# Patient Record
Sex: MALE | Race: WHITE | NOT HISPANIC OR LATINO
[De-identification: names, ages, dates, MRNs, and addresses within clinical notes are randomized per-mention and may not be internally consistent; named-entity substitution may affect disease eponyms.]

---

## 2019-07-24 ENCOUNTER — APPOINTMENT (OUTPATIENT)
Dept: FAMILY MEDICINE | Facility: CLINIC | Age: 59
End: 2019-07-24
Payer: COMMERCIAL

## 2019-07-24 ENCOUNTER — RECORD ABSTRACTING (OUTPATIENT)
Age: 59
End: 2019-07-24

## 2019-07-24 ENCOUNTER — TRANSCRIPTION ENCOUNTER (OUTPATIENT)
Age: 59
End: 2019-07-24

## 2019-07-24 VITALS
WEIGHT: 223 LBS | TEMPERATURE: 98 F | OXYGEN SATURATION: 97 % | HEIGHT: 72 IN | BODY MASS INDEX: 30.2 KG/M2 | HEART RATE: 100 BPM

## 2019-07-24 VITALS — SYSTOLIC BLOOD PRESSURE: 110 MMHG | HEART RATE: 72 BPM | DIASTOLIC BLOOD PRESSURE: 70 MMHG

## 2019-07-24 DIAGNOSIS — Z78.9 OTHER SPECIFIED HEALTH STATUS: ICD-10-CM

## 2019-07-24 LAB
BILIRUB UR QL STRIP: NORMAL
CLARITY UR: CLEAR
GLUCOSE UR-MCNC: NORMAL
HCG UR QL: 0.2 EU/DL
HGB UR QL STRIP.AUTO: NORMAL
KETONES UR-MCNC: NORMAL
LEUKOCYTE ESTERASE UR QL STRIP: NORMAL
NITRITE UR QL STRIP: NORMAL
PH UR STRIP: 5
SP GR UR STRIP: 1

## 2019-07-24 PROCEDURE — 36415 COLL VENOUS BLD VENIPUNCTURE: CPT

## 2019-07-24 PROCEDURE — 99214 OFFICE O/P EST MOD 30 MIN: CPT | Mod: 25

## 2019-07-24 PROCEDURE — 81003 URINALYSIS AUTO W/O SCOPE: CPT | Mod: QW

## 2019-07-24 NOTE — PHYSICAL EXAM
[No Acute Distress] : no acute distress [Well Nourished] : well nourished [Well Developed] : well developed [Well-Appearing] : well-appearing [Normal Sclera/Conjunctiva] : normal sclera/conjunctiva [EOMI] : extraocular movements intact [PERRL] : pupils equal round and reactive to light [Normal Outer Ear/Nose] : the outer ears and nose were normal in appearance [Normal Oropharynx] : the oropharynx was normal [No JVD] : no jugular venous distention [No Lymphadenopathy] : no lymphadenopathy [Supple] : supple [Thyroid Normal, No Nodules] : the thyroid was normal and there were no nodules present [No Respiratory Distress] : no respiratory distress  [No Accessory Muscle Use] : no accessory muscle use [Clear to Auscultation] : lungs were clear to auscultation bilaterally [Normal Rate] : normal rate  [Regular Rhythm] : with a regular rhythm [Normal S1, S2] : normal S1 and S2 [No Murmur] : no murmur heard [No Carotid Bruits] : no carotid bruits [No Abdominal Bruit] : a ~M bruit was not heard ~T in the abdomen [No Varicosities] : no varicosities [Pedal Pulses Present] : the pedal pulses are present [No Edema] : there was no peripheral edema [No Palpable Aorta] : no palpable aorta [No Extremity Clubbing/Cyanosis] : no extremity clubbing/cyanosis [Soft] : abdomen soft [Non Tender] : non-tender [Non-distended] : non-distended [No Masses] : no abdominal mass palpated [No HSM] : no HSM [Normal Bowel Sounds] : normal bowel sounds [Normal Posterior Cervical Nodes] : no posterior cervical lymphadenopathy [Normal Anterior Cervical Nodes] : no anterior cervical lymphadenopathy [No CVA Tenderness] : no CVA  tenderness [No Spinal Tenderness] : no spinal tenderness [No Joint Swelling] : no joint swelling [Grossly Normal Strength/Tone] : grossly normal strength/tone [No Rash] : no rash [Coordination Grossly Intact] : coordination grossly intact [No Focal Deficits] : no focal deficits [Normal Gait] : normal gait [Deep Tendon Reflexes (DTR)] : deep tendon reflexes were 2+ and symmetric [Normal Affect] : the affect was normal [Normal Insight/Judgement] : insight and judgment were intact

## 2019-07-25 LAB
ALBUMIN SERPL ELPH-MCNC: 4.7 G/DL
ALP BLD-CCNC: 68 U/L
ALT SERPL-CCNC: 20 U/L
ANION GAP SERPL CALC-SCNC: 14 MMOL/L
AST SERPL-CCNC: 21 U/L
BASOPHILS # BLD AUTO: 0.04 K/UL
BASOPHILS NFR BLD AUTO: 0.6 %
BILIRUB SERPL-MCNC: 0.3 MG/DL
BUN SERPL-MCNC: 21 MG/DL
CALCIUM SERPL-MCNC: 9.7 MG/DL
CHLORIDE SERPL-SCNC: 102 MMOL/L
CHOLEST SERPL-MCNC: 214 MG/DL
CHOLEST/HDLC SERPL: 4.6 RATIO
CO2 SERPL-SCNC: 22 MMOL/L
CREAT SERPL-MCNC: 1.05 MG/DL
EOSINOPHIL # BLD AUTO: 0.07 K/UL
EOSINOPHIL NFR BLD AUTO: 1 %
ESTIMATED AVERAGE GLUCOSE: 111 MG/DL
GLUCOSE SERPL-MCNC: 97 MG/DL
HBA1C MFR BLD HPLC: 5.5 %
HCT VFR BLD CALC: 43.6 %
HDLC SERPL-MCNC: 47 MG/DL
HGB BLD-MCNC: 14.1 G/DL
IMM GRANULOCYTES NFR BLD AUTO: 0.3 %
LDLC SERPL CALC-MCNC: 121 MG/DL
LYMPHOCYTES # BLD AUTO: 2.31 K/UL
LYMPHOCYTES NFR BLD AUTO: 34 %
MAN DIFF?: NORMAL
MCHC RBC-ENTMCNC: 30.7 PG
MCHC RBC-ENTMCNC: 32.3 GM/DL
MCV RBC AUTO: 94.8 FL
MONOCYTES # BLD AUTO: 0.54 K/UL
MONOCYTES NFR BLD AUTO: 7.9 %
NEUTROPHILS # BLD AUTO: 3.82 K/UL
NEUTROPHILS NFR BLD AUTO: 56.2 %
PLATELET # BLD AUTO: 242 K/UL
POTASSIUM SERPL-SCNC: 4 MMOL/L
PROT SERPL-MCNC: 7.5 G/DL
PSA SERPL-MCNC: 2.02 NG/ML
RBC # BLD: 4.6 M/UL
RBC # FLD: 12.3 %
SODIUM SERPL-SCNC: 138 MMOL/L
T4 FREE SERPL-MCNC: 1.1 NG/DL
TRIGL SERPL-MCNC: 229 MG/DL
TSH SERPL-ACNC: 1.73 UIU/ML
WBC # FLD AUTO: 6.8 K/UL

## 2019-11-06 ENCOUNTER — APPOINTMENT (OUTPATIENT)
Dept: FAMILY MEDICINE | Facility: CLINIC | Age: 59
End: 2019-11-06
Payer: COMMERCIAL

## 2019-11-06 VITALS
BODY MASS INDEX: 27.7 KG/M2 | HEART RATE: 78 BPM | OXYGEN SATURATION: 97 % | WEIGHT: 204.5 LBS | TEMPERATURE: 97.6 F | HEIGHT: 72 IN

## 2019-11-06 VITALS — DIASTOLIC BLOOD PRESSURE: 78 MMHG | HEART RATE: 68 BPM | SYSTOLIC BLOOD PRESSURE: 122 MMHG

## 2019-11-06 PROCEDURE — 99213 OFFICE O/P EST LOW 20 MIN: CPT

## 2020-06-03 ENCOUNTER — APPOINTMENT (OUTPATIENT)
Dept: FAMILY MEDICINE | Facility: CLINIC | Age: 60
End: 2020-06-03
Payer: COMMERCIAL

## 2020-06-03 VITALS — HEART RATE: 76 BPM | DIASTOLIC BLOOD PRESSURE: 78 MMHG | SYSTOLIC BLOOD PRESSURE: 122 MMHG

## 2020-06-03 VITALS
BODY MASS INDEX: 25.71 KG/M2 | HEART RATE: 75 BPM | OXYGEN SATURATION: 97 % | RESPIRATION RATE: 12 BRPM | WEIGHT: 189.6 LBS | TEMPERATURE: 97.9 F

## 2020-06-03 LAB
BILIRUB UR QL STRIP: NORMAL
CLARITY UR: CLEAR
COLLECTION METHOD: NORMAL
GLUCOSE UR-MCNC: NORMAL
HCG UR QL: 0.2 EU/DL
HGB UR QL STRIP.AUTO: NORMAL
KETONES UR-MCNC: NORMAL
LEUKOCYTE ESTERASE UR QL STRIP: NORMAL
NITRITE UR QL STRIP: NORMAL
PH UR STRIP: 6
PROT UR STRIP-MCNC: NORMAL
SP GR UR STRIP: 1.03

## 2020-06-03 PROCEDURE — 36415 COLL VENOUS BLD VENIPUNCTURE: CPT

## 2020-06-03 PROCEDURE — 99213 OFFICE O/P EST LOW 20 MIN: CPT | Mod: 25

## 2020-06-03 PROCEDURE — 81003 URINALYSIS AUTO W/O SCOPE: CPT | Mod: QW

## 2020-06-03 NOTE — PHYSICAL EXAM
[Well Nourished] : well nourished [No Acute Distress] : no acute distress [Well Developed] : well developed [Well-Appearing] : well-appearing [Normal Sclera/Conjunctiva] : normal sclera/conjunctiva [PERRL] : pupils equal round and reactive to light [EOMI] : extraocular movements intact [Normal Outer Ear/Nose] : the outer ears and nose were normal in appearance [No JVD] : no jugular venous distention [Normal Oropharynx] : the oropharynx was normal [Supple] : supple [No Lymphadenopathy] : no lymphadenopathy [Thyroid Normal, No Nodules] : the thyroid was normal and there were no nodules present [No Respiratory Distress] : no respiratory distress  [No Accessory Muscle Use] : no accessory muscle use [Clear to Auscultation] : lungs were clear to auscultation bilaterally [Regular Rhythm] : with a regular rhythm [Normal Rate] : normal rate  [Normal S1, S2] : normal S1 and S2 [No Murmur] : no murmur heard [No Carotid Bruits] : no carotid bruits [No Varicosities] : no varicosities [No Abdominal Bruit] : a ~M bruit was not heard ~T in the abdomen [Pedal Pulses Present] : the pedal pulses are present [No Edema] : there was no peripheral edema [No Palpable Aorta] : no palpable aorta [Soft] : abdomen soft [No Extremity Clubbing/Cyanosis] : no extremity clubbing/cyanosis [Non Tender] : non-tender [Non-distended] : non-distended [No HSM] : no HSM [No Masses] : no abdominal mass palpated [Normal Bowel Sounds] : normal bowel sounds [Normal Posterior Cervical Nodes] : no posterior cervical lymphadenopathy [Normal Anterior Cervical Nodes] : no anterior cervical lymphadenopathy [No Spinal Tenderness] : no spinal tenderness [No CVA Tenderness] : no CVA  tenderness [No Joint Swelling] : no joint swelling [No Rash] : no rash [Grossly Normal Strength/Tone] : grossly normal strength/tone [Coordination Grossly Intact] : coordination grossly intact [No Focal Deficits] : no focal deficits [Normal Gait] : normal gait [Deep Tendon Reflexes (DTR)] : deep tendon reflexes were 2+ and symmetric [Normal Affect] : the affect was normal [Normal Insight/Judgement] : insight and judgment were intact

## 2020-06-04 LAB
ALBUMIN SERPL ELPH-MCNC: 4.8 G/DL
ALP BLD-CCNC: 58 U/L
ALT SERPL-CCNC: 30 U/L
ANION GAP SERPL CALC-SCNC: 15 MMOL/L
AST SERPL-CCNC: 35 U/L
BASOPHILS # BLD AUTO: 0.05 K/UL
BASOPHILS NFR BLD AUTO: 0.7 %
BILIRUB SERPL-MCNC: 0.6 MG/DL
BUN SERPL-MCNC: 28 MG/DL
CALCIUM SERPL-MCNC: 9.9 MG/DL
CHLORIDE SERPL-SCNC: 102 MMOL/L
CHOLEST SERPL-MCNC: 161 MG/DL
CHOLEST/HDLC SERPL: 2.5 RATIO
CO2 SERPL-SCNC: 24 MMOL/L
CREAT SERPL-MCNC: 1.11 MG/DL
EOSINOPHIL # BLD AUTO: 0.06 K/UL
EOSINOPHIL NFR BLD AUTO: 0.8 %
ESTIMATED AVERAGE GLUCOSE: 108 MG/DL
GLUCOSE SERPL-MCNC: 76 MG/DL
HBA1C MFR BLD HPLC: 5.4 %
HCT VFR BLD CALC: 40.6 %
HDLC SERPL-MCNC: 66 MG/DL
HGB BLD-MCNC: 13.2 G/DL
IMM GRANULOCYTES NFR BLD AUTO: 0.3 %
LDLC SERPL CALC-MCNC: 82 MG/DL
LYMPHOCYTES # BLD AUTO: 2.04 K/UL
LYMPHOCYTES NFR BLD AUTO: 28.1 %
MAN DIFF?: NORMAL
MCHC RBC-ENTMCNC: 31.1 PG
MCHC RBC-ENTMCNC: 32.5 GM/DL
MCV RBC AUTO: 95.8 FL
MONOCYTES # BLD AUTO: 0.42 K/UL
MONOCYTES NFR BLD AUTO: 5.8 %
NEUTROPHILS # BLD AUTO: 4.66 K/UL
NEUTROPHILS NFR BLD AUTO: 64.3 %
PLATELET # BLD AUTO: 230 K/UL
POTASSIUM SERPL-SCNC: 4.2 MMOL/L
PROT SERPL-MCNC: 7.4 G/DL
PSA SERPL-MCNC: 2.39 NG/ML
RBC # BLD: 4.24 M/UL
RBC # FLD: 12.4 %
SARS-COV-2 IGG SERPL IA-ACNC: 0.1 INDEX
SARS-COV-2 IGG SERPL QL IA: NEGATIVE
SODIUM SERPL-SCNC: 141 MMOL/L
T4 FREE SERPL-MCNC: 1.1 NG/DL
TRIGL SERPL-MCNC: 67 MG/DL
TSH SERPL-ACNC: 1.31 UIU/ML
WBC # FLD AUTO: 7.25 K/UL

## 2021-06-09 ENCOUNTER — LABORATORY RESULT (OUTPATIENT)
Age: 61
End: 2021-06-09

## 2021-06-09 ENCOUNTER — APPOINTMENT (OUTPATIENT)
Dept: FAMILY MEDICINE | Facility: CLINIC | Age: 61
End: 2021-06-09
Payer: COMMERCIAL

## 2021-06-09 VITALS — HEART RATE: 72 BPM | SYSTOLIC BLOOD PRESSURE: 120 MMHG | DIASTOLIC BLOOD PRESSURE: 70 MMHG

## 2021-06-09 VITALS
WEIGHT: 194 LBS | BODY MASS INDEX: 26.28 KG/M2 | OXYGEN SATURATION: 98 % | TEMPERATURE: 97.6 F | HEIGHT: 72 IN | HEART RATE: 77 BPM

## 2021-06-09 PROCEDURE — 99396 PREV VISIT EST AGE 40-64: CPT | Mod: 25

## 2021-06-09 PROCEDURE — 36415 COLL VENOUS BLD VENIPUNCTURE: CPT

## 2021-06-09 PROCEDURE — 99072 ADDL SUPL MATRL&STAF TM PHE: CPT

## 2021-06-09 PROCEDURE — 81003 URINALYSIS AUTO W/O SCOPE: CPT | Mod: QW

## 2021-06-10 LAB
BILIRUB UR QL STRIP: NORMAL
GLUCOSE UR-MCNC: NORMAL
HCG UR QL: 0.2 EU/DL
HGB UR QL STRIP.AUTO: NORMAL
KETONES UR-MCNC: NORMAL
LEUKOCYTE ESTERASE UR QL STRIP: NORMAL
NITRITE UR QL STRIP: NORMAL
PH UR STRIP: 6.5
PROT UR STRIP-MCNC: NORMAL
SP GR UR STRIP: 1.03

## 2022-05-31 ENCOUNTER — RX RENEWAL (OUTPATIENT)
Age: 62
End: 2022-05-31

## 2022-06-09 ENCOUNTER — APPOINTMENT (OUTPATIENT)
Dept: FAMILY MEDICINE | Facility: CLINIC | Age: 62
End: 2022-06-09
Payer: COMMERCIAL

## 2022-06-09 VITALS
HEART RATE: 73 BPM | TEMPERATURE: 97.6 F | RESPIRATION RATE: 14 BRPM | OXYGEN SATURATION: 98 % | WEIGHT: 197 LBS | BODY MASS INDEX: 26.72 KG/M2

## 2022-06-09 LAB
BILIRUB UR QL STRIP: NORMAL
CLARITY UR: CLEAR
COLLECTION METHOD: NORMAL
GLUCOSE UR-MCNC: NORMAL
HCG UR QL: 0.2 EU/DL
HGB UR QL STRIP.AUTO: NORMAL
KETONES UR-MCNC: NORMAL
LEUKOCYTE ESTERASE UR QL STRIP: NORMAL
NITRITE UR QL STRIP: NORMAL
PH UR STRIP: 5.5
PROT UR STRIP-MCNC: NORMAL
SP GR UR STRIP: 1.01

## 2022-06-09 PROCEDURE — 99214 OFFICE O/P EST MOD 30 MIN: CPT | Mod: 25

## 2022-06-09 PROCEDURE — 36415 COLL VENOUS BLD VENIPUNCTURE: CPT

## 2022-06-09 PROCEDURE — 99396 PREV VISIT EST AGE 40-64: CPT | Mod: 25

## 2022-06-09 PROCEDURE — 81003 URINALYSIS AUTO W/O SCOPE: CPT | Mod: QW

## 2022-06-10 LAB
ALBUMIN SERPL ELPH-MCNC: 4.9 G/DL
ALP BLD-CCNC: 70 U/L
ALT SERPL-CCNC: 31 U/L
ANION GAP SERPL CALC-SCNC: 15 MMOL/L
AST SERPL-CCNC: 30 U/L
BASOPHILS # BLD AUTO: 0.04 K/UL
BASOPHILS NFR BLD AUTO: 0.7 %
BILIRUB SERPL-MCNC: 0.5 MG/DL
BUN SERPL-MCNC: 24 MG/DL
CALCIUM SERPL-MCNC: 10.3 MG/DL
CHLORIDE SERPL-SCNC: 100 MMOL/L
CHOLEST SERPL-MCNC: 188 MG/DL
CO2 SERPL-SCNC: 24 MMOL/L
CREAT SERPL-MCNC: 1.02 MG/DL
EGFR: 84 ML/MIN/1.73M2
EOSINOPHIL # BLD AUTO: 0.02 K/UL
EOSINOPHIL NFR BLD AUTO: 0.3 %
ESTIMATED AVERAGE GLUCOSE: 114 MG/DL
GLUCOSE SERPL-MCNC: 97 MG/DL
HBA1C MFR BLD HPLC: 5.6 %
HCT VFR BLD CALC: 41.9 %
HDLC SERPL-MCNC: 64 MG/DL
HGB BLD-MCNC: 13.9 G/DL
IMM GRANULOCYTES NFR BLD AUTO: 0.2 %
LDLC SERPL CALC-MCNC: 111 MG/DL
LYMPHOCYTES # BLD AUTO: 1.66 K/UL
LYMPHOCYTES NFR BLD AUTO: 27.2 %
MAN DIFF?: NORMAL
MCHC RBC-ENTMCNC: 30.4 PG
MCHC RBC-ENTMCNC: 33.2 GM/DL
MCV RBC AUTO: 91.7 FL
MONOCYTES # BLD AUTO: 0.44 K/UL
MONOCYTES NFR BLD AUTO: 7.2 %
NEUTROPHILS # BLD AUTO: 3.94 K/UL
NEUTROPHILS NFR BLD AUTO: 64.4 %
NONHDLC SERPL-MCNC: 124 MG/DL
PLATELET # BLD AUTO: 294 K/UL
POTASSIUM SERPL-SCNC: 4.6 MMOL/L
PROT SERPL-MCNC: 7.7 G/DL
PSA SERPL-MCNC: 6.98 NG/ML
RBC # BLD: 4.57 M/UL
RBC # FLD: 12.2 %
SODIUM SERPL-SCNC: 139 MMOL/L
T4 FREE SERPL-MCNC: 1.2 NG/DL
TRIGL SERPL-MCNC: 66 MG/DL
TSH SERPL-ACNC: 1.08 UIU/ML
WBC # FLD AUTO: 6.11 K/UL

## 2022-06-13 ENCOUNTER — NON-APPOINTMENT (OUTPATIENT)
Age: 62
End: 2022-06-13

## 2022-06-15 ENCOUNTER — APPOINTMENT (OUTPATIENT)
Dept: UROLOGY | Facility: CLINIC | Age: 62
End: 2022-06-15
Payer: COMMERCIAL

## 2022-06-15 VITALS
HEIGHT: 72 IN | WEIGHT: 197 LBS | HEART RATE: 87 BPM | BODY MASS INDEX: 26.68 KG/M2 | DIASTOLIC BLOOD PRESSURE: 83 MMHG | SYSTOLIC BLOOD PRESSURE: 149 MMHG

## 2022-06-15 PROCEDURE — 99204 OFFICE O/P NEW MOD 45 MIN: CPT

## 2022-06-15 NOTE — PHYSICAL EXAM
[General Appearance - Well Developed] : well developed [General Appearance - Well Nourished] : well nourished [Normal Appearance] : normal appearance [Well Groomed] : well groomed [General Appearance - In No Acute Distress] : no acute distress [Edema] : no peripheral edema [] : no respiratory distress [Respiration, Rhythm And Depth] : normal respiratory rhythm and effort [Exaggerated Use Of Accessory Muscles For Inspiration] : no accessory muscle use [Urethral Meatus] : meatus normal [Urinary Bladder Findings] : the bladder was normal on palpation [Rectal Exam - Seminal Vesicles] : the seminal vesicles were normal [Scrotum] : the scrotum was normal [Epididymis] : the epididymides were normal [Testes Tenderness] : no tenderness of the testes [Testes Mass (___cm)] : there were no testicular masses [Rectal Exam - Rectum] : digital rectal exam was normal [Prostate Enlargement] : the prostate was not enlarged [Prostate Tenderness] : the prostate was not tender [No Prostate Nodules] : no prostate nodules [Normal Station and Gait] : the gait and station were normal for the patient's age [Oriented To Time, Place, And Person] : oriented to person, place, and time [Affect] : the affect was normal [Mood] : the mood was normal [Not Anxious] : not anxious [Inguinal Lymph Nodes Enlarged Bilaterally] : inguinal

## 2022-06-15 NOTE — ASSESSMENT
[FreeTextEntry1] : \par plan:\par - repeat PSA, with no bike riding before\par - ordered prostate MRI\par - next visit in 2 weeks to discuss results and need for prostate biopsy

## 2022-06-28 ENCOUNTER — OUTPATIENT (OUTPATIENT)
Dept: OUTPATIENT SERVICES | Facility: HOSPITAL | Age: 62
LOS: 1 days | End: 2022-06-28
Payer: COMMERCIAL

## 2022-06-28 ENCOUNTER — RESULT REVIEW (OUTPATIENT)
Age: 62
End: 2022-06-28

## 2022-06-28 ENCOUNTER — APPOINTMENT (OUTPATIENT)
Dept: MRI IMAGING | Facility: CLINIC | Age: 62
End: 2022-06-28

## 2022-06-28 DIAGNOSIS — R97.20 ELEVATED PROSTATE SPECIFIC ANTIGEN [PSA]: ICD-10-CM

## 2022-06-28 PROCEDURE — 76498P: CUSTOM | Mod: 26

## 2022-06-28 PROCEDURE — 72197 MRI PELVIS W/O & W/DYE: CPT

## 2022-06-28 PROCEDURE — A9585: CPT

## 2022-06-28 PROCEDURE — 72197 MRI PELVIS W/O & W/DYE: CPT | Mod: 26

## 2022-06-28 PROCEDURE — 76498 UNLISTED MR PROCEDURE: CPT

## 2022-07-05 LAB
PSA FREE FLD-MCNC: 9 %
PSA FREE SERPL-MCNC: 0.45 NG/ML
PSA SERPL-MCNC: 4.87 NG/ML

## 2022-07-11 ENCOUNTER — APPOINTMENT (OUTPATIENT)
Dept: UROLOGY | Facility: CLINIC | Age: 62
End: 2022-07-11

## 2022-07-11 PROCEDURE — 99214 OFFICE O/P EST MOD 30 MIN: CPT

## 2022-07-11 RX ORDER — ENEMA 19; 7 G/133ML; G/133ML
7-19 ENEMA RECTAL
Qty: 1 | Refills: 0 | Status: ACTIVE | COMMUNITY
Start: 2022-07-11 | End: 1900-01-01

## 2022-07-11 RX ORDER — DIAZEPAM 5 MG/1
5 TABLET ORAL
Qty: 1 | Refills: 0 | Status: ACTIVE | COMMUNITY
Start: 2022-07-11 | End: 1900-01-01

## 2022-07-11 NOTE — HISTORY OF PRESENT ILLNESS
[FreeTextEntry1] : 62 yo M for follow up due to elevated PSA\par PMH: HTN\par no FH of prostate cancer\par an avid bike rider\par \par recent PSA follow up:\par 07/01/2022 - 4.87 (free 9%)\par 6/9/2022 - 6.98\par 6/9/2021 - 4.00\par 6/2020 - 2.39\par 7/2019 - 2.02\par \par RACHEL with small prostate and no nodules\par MRI: small prostate, PIRADS 4 lesion\par \par reviewed the results with the patient\par discussed the high likelihood of prostate cancer and the need for biopsies\par discussed fusion transperineal biopsies, the procedure, the possible complications and the recovery.\par discussed this will be done with Dr. Nath at [facility].\par \par plan:\par - fusion biopsies\par  [None] : no symptoms

## 2022-07-11 NOTE — PHYSICAL EXAM
[General Appearance - Well Developed] : well developed [General Appearance - Well Nourished] : well nourished [Normal Appearance] : normal appearance [Well Groomed] : well groomed [General Appearance - In No Acute Distress] : no acute distress [Edema] : no peripheral edema [] : no respiratory distress [Exaggerated Use Of Accessory Muscles For Inspiration] : no accessory muscle use [Respiration, Rhythm And Depth] : normal respiratory rhythm and effort [Oriented To Time, Place, And Person] : oriented to person, place, and time [Affect] : the affect was normal [Mood] : the mood was normal [Not Anxious] : not anxious [Normal Station and Gait] : the gait and station were normal for the patient's age

## 2022-07-13 ENCOUNTER — NON-APPOINTMENT (OUTPATIENT)
Age: 62
End: 2022-07-13

## 2022-07-26 ENCOUNTER — NON-APPOINTMENT (OUTPATIENT)
Age: 62
End: 2022-07-26

## 2022-07-27 ENCOUNTER — APPOINTMENT (OUTPATIENT)
Dept: UROLOGY | Facility: CLINIC | Age: 62
End: 2022-07-27

## 2022-07-27 VITALS
WEIGHT: 197 LBS | HEIGHT: 72 IN | HEART RATE: 82 BPM | BODY MASS INDEX: 26.68 KG/M2 | DIASTOLIC BLOOD PRESSURE: 82 MMHG | SYSTOLIC BLOOD PRESSURE: 130 MMHG | OXYGEN SATURATION: 97 %

## 2022-07-27 VITALS — SYSTOLIC BLOOD PRESSURE: 142 MMHG | DIASTOLIC BLOOD PRESSURE: 84 MMHG

## 2022-07-27 PROCEDURE — 76942 ECHO GUIDE FOR BIOPSY: CPT | Mod: 59

## 2022-07-27 PROCEDURE — 76872 US TRANSRECTAL: CPT

## 2022-07-27 PROCEDURE — 76377 3D RENDER W/INTRP POSTPROCES: CPT

## 2022-07-27 PROCEDURE — 55700: CPT | Mod: 22

## 2022-08-08 ENCOUNTER — APPOINTMENT (OUTPATIENT)
Dept: UROLOGY | Facility: CLINIC | Age: 62
End: 2022-08-08

## 2022-08-08 PROCEDURE — 99214 OFFICE O/P EST MOD 30 MIN: CPT

## 2022-08-08 NOTE — ASSESSMENT
[FreeTextEntry1] : Discussed biopsy results\par PSA = 4.87\par Cait 4+3=7 CAP\par MRI target lesion is positive as well as 4 cores in the right side of the prostate\par he is healthy with no health issues\par We discussed all treatment options for treatment of localized prostate cancer.   \par These included active surveillance, radiation therapy, IMRT and Cyberknife and radical prostatectomy\par We discussed brachytherapy, radiation plus hormonal therapy and cyberknife\par We discussed side effects of HT\par We discussed the risks, benefits and complications of radiation therapy.   We discussed the option of robotic radical prostatectomy and the advantages and disadvantages.   We discussed the risks, benefits and alternatives and complications specifically impotence and incontinence.  We discussed the procedure, in hospital stay and the recovery and expectations.  We discussed my experience with this procedure and my outcomes.\par \par We discussed the cancer outcomes of each options as well.\par He will have a Bone scan and CT scan and he will followup\par I offered him to see a radiation oncologist but he wants to discuss with his wife and get the tests before he does that.\par \par \par

## 2022-08-08 NOTE — HISTORY OF PRESENT ILLNESS
[FreeTextEntry1] : 61 year old healthy man here for bx results\par Tolerated procedure well\par Biopsy reveals [place] 4+3=7, 3+4=7 and 3+3=6 CAP

## 2022-08-10 ENCOUNTER — RESULT REVIEW (OUTPATIENT)
Age: 62
End: 2022-08-10

## 2022-08-29 ENCOUNTER — OUTPATIENT (OUTPATIENT)
Dept: OUTPATIENT SERVICES | Facility: HOSPITAL | Age: 62
LOS: 1 days | End: 2022-08-29

## 2022-08-29 ENCOUNTER — APPOINTMENT (OUTPATIENT)
Dept: CT IMAGING | Facility: CLINIC | Age: 62
End: 2022-08-29

## 2022-08-29 ENCOUNTER — APPOINTMENT (OUTPATIENT)
Dept: NUCLEAR MEDICINE | Facility: CLINIC | Age: 62
End: 2022-08-29

## 2022-08-29 DIAGNOSIS — C61 MALIGNANT NEOPLASM OF PROSTATE: ICD-10-CM

## 2022-08-29 PROCEDURE — 78306 BONE IMAGING WHOLE BODY: CPT | Mod: 26

## 2022-08-29 PROCEDURE — 74177 CT ABD & PELVIS W/CONTRAST: CPT | Mod: 26

## 2022-09-07 ENCOUNTER — APPOINTMENT (OUTPATIENT)
Dept: UROLOGY | Facility: CLINIC | Age: 62
End: 2022-09-07

## 2022-09-07 VITALS
DIASTOLIC BLOOD PRESSURE: 83 MMHG | HEART RATE: 80 BPM | BODY MASS INDEX: 26.68 KG/M2 | OXYGEN SATURATION: 99 % | HEIGHT: 72 IN | WEIGHT: 197 LBS | SYSTOLIC BLOOD PRESSURE: 151 MMHG

## 2022-09-07 PROCEDURE — 99214 OFFICE O/P EST MOD 30 MIN: CPT

## 2022-09-11 NOTE — HISTORY OF PRESENT ILLNESS
[None] : no symptoms [FreeTextEntry1] : 62 yo presents today for a follow-up appointment for prostate cancer. \par Had a recent CT scan (8.29.22.) and NM bone scan (8.29.22.) to r/o metastatic disease. \par Here to determine his plan of care. \par PSA 4.87\par MRI prostate  6.28.22\par PV 35 cc\par PIRADS 4 lesion. \par GS 7 (4+3)\par

## 2022-09-11 NOTE — ASSESSMENT
[FreeTextEntry1] : Reviewed treatment options for prostate cancer.\par re reviewed MRI, PSA and metastatic workup\par He is not a candidate for active surveillance due to GS 7 (4+3).\par Discussed surgery for a robotic radical prostatectomy vs. radiation with hormonal therapy. \par Pre, intra and post-operative procedures and protocols explained to patient. Potential complications such as bleeding, infection, injury to surrounding organs, impotence and incontinence explained to patient. \par Radiation inclusive of Cyberknife explained to patient. Potential complications such as cystitis, proctitis, secondary cancers, impotence and incontinence explained to patient. \par Answered all questions and addressed all concerns.\par Pt. choses to proceed with having surgery. \par Surgical consent signed at time of visit.\par Will proceed with scheduling his surgery as planned.

## 2022-09-16 ENCOUNTER — APPOINTMENT (OUTPATIENT)
Dept: FAMILY MEDICINE | Facility: CLINIC | Age: 62
End: 2022-09-16

## 2022-09-16 VITALS
TEMPERATURE: 97.8 F | SYSTOLIC BLOOD PRESSURE: 124 MMHG | RESPIRATION RATE: 14 BRPM | DIASTOLIC BLOOD PRESSURE: 82 MMHG | OXYGEN SATURATION: 98 % | HEART RATE: 78 BPM

## 2022-09-16 PROCEDURE — 99214 OFFICE O/P EST MOD 30 MIN: CPT

## 2022-09-16 NOTE — ASSESSMENT
[FreeTextEntry1] : HTN:\par patient with BP of 124/80 at today's visit\par Discussed options of coming off of blood pressure medication, however advised him that we should wait to do so until after his radical prostatectomy.  Once he is healed from this procedure, we will begin to slowly titrate down lisinopril if his blood pressure tolerates\par -will continue current medication regimen of lisinopril 20mg at this time\par -Discussed with patient the importance of continuing to manage high blood pressure to decrease risk of heart failure, stroke, artherosclerosis, kidney disease, blindness and dementia.\par \par Prostate Ca\par recent prostate biopsy positive for prostate ca\par patient to undergo robotic radical prostatectomy with urology\par will be coming to office in a few weeks for medical clearance

## 2022-09-16 NOTE — HISTORY OF PRESENT ILLNESS
[FreeTextEntry1] : Pt is here to discuss BP medication concerns. [de-identified] : 62 y/o male with prostate cancer, HTN, presents for follow up.  Patient states that since June he has been on just lisinopril 20 mg daily as opposed to lisinopril-hctz 20-12.5mg daily which he was previously on. He has been following with urology due to an elevated PSA. He underwent MRI and prostate biopsy which was positive for prostate cancer. Workup for metastasis was negative. Patient plans to undergo robotic radical prostatectomy in a few weeks. States that even though he has had spikes in his bp at the urologist office, this was because he was feeling very stressed. He is interested in coming off of his bp meds.

## 2022-09-27 ENCOUNTER — OUTPATIENT (OUTPATIENT)
Dept: OUTPATIENT SERVICES | Facility: HOSPITAL | Age: 62
LOS: 1 days | End: 2022-09-27
Payer: COMMERCIAL

## 2022-09-27 ENCOUNTER — RESULT REVIEW (OUTPATIENT)
Age: 62
End: 2022-09-27

## 2022-09-27 VITALS
TEMPERATURE: 98 F | DIASTOLIC BLOOD PRESSURE: 79 MMHG | SYSTOLIC BLOOD PRESSURE: 144 MMHG | RESPIRATION RATE: 16 BRPM | HEIGHT: 72 IN | HEART RATE: 82 BPM | WEIGHT: 196.21 LBS | OXYGEN SATURATION: 100 %

## 2022-09-27 DIAGNOSIS — H26.9 UNSPECIFIED CATARACT: Chronic | ICD-10-CM

## 2022-09-27 DIAGNOSIS — Z29.9 ENCOUNTER FOR PROPHYLACTIC MEASURES, UNSPECIFIED: ICD-10-CM

## 2022-09-27 DIAGNOSIS — C61 MALIGNANT NEOPLASM OF PROSTATE: ICD-10-CM

## 2022-09-27 DIAGNOSIS — Z98.890 OTHER SPECIFIED POSTPROCEDURAL STATES: Chronic | ICD-10-CM

## 2022-09-27 DIAGNOSIS — Z01.818 ENCOUNTER FOR OTHER PREPROCEDURAL EXAMINATION: ICD-10-CM

## 2022-09-27 LAB
ANION GAP SERPL CALC-SCNC: 4 MMOL/L — LOW (ref 5–17)
APPEARANCE UR: CLEAR — SIGNIFICANT CHANGE UP
APTT BLD: 36.6 SEC — HIGH (ref 27.5–35.5)
BASOPHILS # BLD AUTO: 0.04 K/UL — SIGNIFICANT CHANGE UP (ref 0–0.2)
BASOPHILS NFR BLD AUTO: 0.7 % — SIGNIFICANT CHANGE UP (ref 0–2)
BILIRUB UR-MCNC: NEGATIVE — SIGNIFICANT CHANGE UP
BUN SERPL-MCNC: 23 MG/DL — SIGNIFICANT CHANGE UP (ref 7–23)
CALCIUM SERPL-MCNC: 10 MG/DL — SIGNIFICANT CHANGE UP (ref 8.5–10.1)
CHLORIDE SERPL-SCNC: 107 MMOL/L — SIGNIFICANT CHANGE UP (ref 96–108)
CO2 SERPL-SCNC: 28 MMOL/L — SIGNIFICANT CHANGE UP (ref 22–31)
COLOR SPEC: YELLOW — SIGNIFICANT CHANGE UP
CREAT SERPL-MCNC: 1.02 MG/DL — SIGNIFICANT CHANGE UP (ref 0.5–1.3)
DIFF PNL FLD: NEGATIVE — SIGNIFICANT CHANGE UP
EGFR: 84 ML/MIN/1.73M2 — SIGNIFICANT CHANGE UP
EOSINOPHIL # BLD AUTO: 0.02 K/UL — SIGNIFICANT CHANGE UP (ref 0–0.5)
EOSINOPHIL NFR BLD AUTO: 0.3 % — SIGNIFICANT CHANGE UP (ref 0–6)
GLUCOSE SERPL-MCNC: 103 MG/DL — HIGH (ref 70–99)
GLUCOSE UR QL: NEGATIVE — SIGNIFICANT CHANGE UP
HCT VFR BLD CALC: 42.8 % — SIGNIFICANT CHANGE UP (ref 39–50)
HGB BLD-MCNC: 14.2 G/DL — SIGNIFICANT CHANGE UP (ref 13–17)
IMM GRANULOCYTES NFR BLD AUTO: 0.2 % — SIGNIFICANT CHANGE UP (ref 0–0.9)
INR BLD: 1.03 RATIO — SIGNIFICANT CHANGE UP (ref 0.88–1.16)
KETONES UR-MCNC: NEGATIVE — SIGNIFICANT CHANGE UP
LEUKOCYTE ESTERASE UR-ACNC: NEGATIVE — SIGNIFICANT CHANGE UP
LYMPHOCYTES # BLD AUTO: 1.77 K/UL — SIGNIFICANT CHANGE UP (ref 1–3.3)
LYMPHOCYTES # BLD AUTO: 29.7 % — SIGNIFICANT CHANGE UP (ref 13–44)
MCHC RBC-ENTMCNC: 30.5 PG — SIGNIFICANT CHANGE UP (ref 27–34)
MCHC RBC-ENTMCNC: 33.2 GM/DL — SIGNIFICANT CHANGE UP (ref 32–36)
MCV RBC AUTO: 92 FL — SIGNIFICANT CHANGE UP (ref 80–100)
MONOCYTES # BLD AUTO: 0.37 K/UL — SIGNIFICANT CHANGE UP (ref 0–0.9)
MONOCYTES NFR BLD AUTO: 6.2 % — SIGNIFICANT CHANGE UP (ref 2–14)
NEUTROPHILS # BLD AUTO: 3.75 K/UL — SIGNIFICANT CHANGE UP (ref 1.8–7.4)
NEUTROPHILS NFR BLD AUTO: 62.9 % — SIGNIFICANT CHANGE UP (ref 43–77)
NITRITE UR-MCNC: NEGATIVE — SIGNIFICANT CHANGE UP
PH UR: 6.5 — SIGNIFICANT CHANGE UP (ref 5–8)
PLATELET # BLD AUTO: 205 K/UL — SIGNIFICANT CHANGE UP (ref 150–400)
POTASSIUM SERPL-MCNC: 4.2 MMOL/L — SIGNIFICANT CHANGE UP (ref 3.5–5.3)
POTASSIUM SERPL-SCNC: 4.2 MMOL/L — SIGNIFICANT CHANGE UP (ref 3.5–5.3)
PROT UR-MCNC: NEGATIVE — SIGNIFICANT CHANGE UP
PROTHROM AB SERPL-ACNC: 12 SEC — SIGNIFICANT CHANGE UP (ref 10.5–13.4)
RBC # BLD: 4.65 M/UL — SIGNIFICANT CHANGE UP (ref 4.2–5.8)
RBC # FLD: 11.9 % — SIGNIFICANT CHANGE UP (ref 10.3–14.5)
SODIUM SERPL-SCNC: 139 MMOL/L — SIGNIFICANT CHANGE UP (ref 135–145)
SP GR SPEC: 1 — LOW (ref 1.01–1.02)
UROBILINOGEN FLD QL: NEGATIVE — SIGNIFICANT CHANGE UP
WBC # BLD: 5.96 K/UL — SIGNIFICANT CHANGE UP (ref 3.8–10.5)
WBC # FLD AUTO: 5.96 K/UL — SIGNIFICANT CHANGE UP (ref 3.8–10.5)

## 2022-09-27 PROCEDURE — 86850 RBC ANTIBODY SCREEN: CPT

## 2022-09-27 PROCEDURE — 93005 ELECTROCARDIOGRAM TRACING: CPT

## 2022-09-27 PROCEDURE — 86900 BLOOD TYPING SEROLOGIC ABO: CPT

## 2022-09-27 PROCEDURE — 71046 X-RAY EXAM CHEST 2 VIEWS: CPT | Mod: 26

## 2022-09-27 PROCEDURE — 93010 ELECTROCARDIOGRAM REPORT: CPT

## 2022-09-27 PROCEDURE — 86901 BLOOD TYPING SEROLOGIC RH(D): CPT

## 2022-09-27 PROCEDURE — 87086 URINE CULTURE/COLONY COUNT: CPT

## 2022-09-27 PROCEDURE — 85025 COMPLETE CBC W/AUTO DIFF WBC: CPT

## 2022-09-27 PROCEDURE — 85610 PROTHROMBIN TIME: CPT

## 2022-09-27 PROCEDURE — 85730 THROMBOPLASTIN TIME PARTIAL: CPT

## 2022-09-27 PROCEDURE — 80048 BASIC METABOLIC PNL TOTAL CA: CPT

## 2022-09-27 PROCEDURE — 71046 X-RAY EXAM CHEST 2 VIEWS: CPT

## 2022-09-27 PROCEDURE — 81003 URINALYSIS AUTO W/O SCOPE: CPT

## 2022-09-27 PROCEDURE — 99214 OFFICE O/P EST MOD 30 MIN: CPT | Mod: 25

## 2022-09-27 PROCEDURE — 36415 COLL VENOUS BLD VENIPUNCTURE: CPT

## 2022-09-27 NOTE — H&P PST ADULT - FUNCTIONAL ASSESSMENT - BASIC MOBILITY 6.
To Dr. Navneet Guidry---    Patient called letting you know he was just discharged from the [facility]). He is very weak right now, and was told that a Home Care nurse or RN will be coming out to the house to help him with physical therapy.---OR-----he is not sure if you are suppose to coordinate the Home Care Physical Therapy---------he just returned to his home, this afternoon, after being in the hospital, for 2 weeks with pneumonia. 4 = No assist / stand by assistance

## 2022-09-27 NOTE — H&P PST ADULT - NSICDXPASTMEDICALHX_GEN_ALL_CORE_FT
PAST MEDICAL HISTORY:  Bilateral cataracts     Elevated PSA     Enlarged prostate     HTN (hypertension)     Pneumonia 20 yrs ago    Prostate cancer

## 2022-09-27 NOTE — H&P PST ADULT - NSANTHOSAYNRD_GEN_A_CORE
No. TIMOTHY screening performed.  STOP BANG Legend: 0-2 = LOW Risk; 3-4 = INTERMEDIATE Risk; 5-8 = HIGH Risk

## 2022-09-27 NOTE — H&P PST ADULT - ASSESSMENT
61 y.o male scheduled for  61 y.o male scheduled for Robotic Assisted Prostatectomy   Plan  1. Stop all NSAIDS, herbal supplements and vitamins for 7 days.  2. NPO at midnight.  3. Take the following medications Lisinopril  with small sips of water on the morning of your procedure/surgery.  4. Use EZ sponges as directed  5. Labs, EKG, CXR as per surgeon  6. PMD Brooks Zelaya visit for optimization prior to surgery as per surgeon  7. COVID swab to be done 10/3/2022    CAPRINI SCORE    AGE RELATED RISK FACTORS                                                       MOBILITY RELATED FACTORS  [ x] Age 41-60 years                                            (1 Point)                  [ ] Bed rest                                                        (1 Point)  [ ] Age: 61-74 years                                           (2 Points)                [ ] Plaster cast                                                   (2 Points)  [ ] Age= 75 years                                              (3 Points)                 [ ] Bed bound for more than 72 hours                   (2 Points)    DISEASE RELATED RISK FACTORS                                               GENDER SPECIFIC FACTORS  [ ] Edema in the lower extremities                       (1 Point)                  [ ] Pregnancy                                                     (1 Point)  [ ] Varicose veins                                               (1 Point)                  [ ] Post-partum < 6 weeks                                   (1 Point)             [x ] BMI > 25 Kg/m2                                            (1 Point)                  [ ] Hormonal therapy  or oral contraception            (1 Point)                 [ ] Sepsis (in the previous month)                        (1 Point)                  [ ] History of pregnancy complications  [ ] Pneumonia or serious lung disease                                               [ ] Unexplained or recurrent                       (1 Point)           (in the previous month)                               (1 Point)  [ ] Abnormal pulmonary function test                     (1 Point)                 SURGERY RELATED RISK FACTORS  [ ] Acute myocardial infarction                              (1 Point)                 [ ]  Section                                            (1 Point)  [ ] Congestive heart failure (in the previous month)  (1 Point)                 [ ] Minor surgery                                                 (1 Point)   [ ] Inflammatory bowel disease                             (1 Point)                 [ ] Arthroscopic surgery                                        (2 Points)  [ ] Central venous access                                    (2 Points)                [x ] General surgery lasting more than 45 minutes   (2 Points)       [ ] Stroke (in the previous month)                          (5 Points)               [ ] Elective arthroplasty                                        (5 Points)                                                                                                                                               HEMATOLOGY RELATED FACTORS                                                 TRAUMA RELATED RISK FACTORS  [ ] Prior episodes of VTE                                     (3 Points)                 [ ] Fracture of the hip, pelvis, or leg                       (5 Points)  [ ] Positive family history for VTE                         (3 Points)                 [ ] Acute spinal cord injury (in the previous month)  (5 Points)  [ ] Prothrombin 66385 A                                      (3 Points)                 [ ] Paralysis  (less than 1 month)                          (5 Points)  [ ] Factor V Leiden                                             (3 Points)                 [ ] Multiple Trauma within 1 month                         (5 Points)  [ ] Lupus anticoagulants                                     (3 Points)                                                           [ ] Anticardiolipin antibodies                                (3 Points)                                                       [ ] High homocysteine in the blood                      (3 Points)                                             [ ] Other congenital or acquired thrombophilia       (3 Points)                                                [ ] Heparin induced thrombocytopenia                  (3 Points)                                          Total Score [   4       ]    The Caprini score indicates this patient is at risk for a VTE event (score 3-5).  Most surgical patients in this group would benefit from pharmacologic prophylaxis.  The surgical team will determine the balance between VTE risk and bleeding risk

## 2022-09-27 NOTE — H&P PST ADULT - HISTORY OF PRESENT ILLNESS
61 y.o  61 y.o WD, WN male presents to PST with hx of elevated PSA. Patient states he was noted to have an elevated PSA at his annual physical exam. He was referred to urology and reports further diagnostics and biopsy revealed an enlarged prostate and positive cancer. He discussed options with surgeon and now scheduled for Robotic Assisted Prostatectomy

## 2022-09-28 DIAGNOSIS — Z01.818 ENCOUNTER FOR OTHER PREPROCEDURAL EXAMINATION: ICD-10-CM

## 2022-09-28 DIAGNOSIS — C61 MALIGNANT NEOPLASM OF PROSTATE: ICD-10-CM

## 2022-09-28 LAB
CULTURE RESULTS: SIGNIFICANT CHANGE UP
SPECIMEN SOURCE: SIGNIFICANT CHANGE UP

## 2022-09-29 ENCOUNTER — APPOINTMENT (OUTPATIENT)
Dept: FAMILY MEDICINE | Facility: CLINIC | Age: 62
End: 2022-09-29

## 2022-09-29 VITALS
SYSTOLIC BLOOD PRESSURE: 122 MMHG | DIASTOLIC BLOOD PRESSURE: 80 MMHG | WEIGHT: 197 LBS | OXYGEN SATURATION: 98 % | TEMPERATURE: 97.7 F | BODY MASS INDEX: 26.68 KG/M2 | HEIGHT: 72 IN | RESPIRATION RATE: 14 BRPM | HEART RATE: 77 BPM

## 2022-09-29 DIAGNOSIS — C61 MALIGNANT NEOPLASM OF PROSTATE: ICD-10-CM

## 2022-09-29 PROCEDURE — 99214 OFFICE O/P EST MOD 30 MIN: CPT

## 2022-09-29 NOTE — HISTORY OF PRESENT ILLNESS
[No Pertinent Cardiac History] : no history of aortic stenosis, atrial fibrillation, coronary artery disease, recent myocardial infarction, or implantable device/pacemaker [No Pertinent Pulmonary History] : no history of asthma, COPD, sleep apnea, or smoking [No Adverse Anesthesia Reaction] : no adverse anesthesia reaction in self or family member [(Patient denies any chest pain, claudication, dyspnea on exertion, orthopnea, palpitations or syncope)] : Patient denies any chest pain, claudication, dyspnea on exertion, orthopnea, palpitations or syncope [Good (7-10 METs)] : Good (7-10 METs) [Chronic Anticoagulation] : no chronic anticoagulation [Chronic Kidney Disease] : no chronic kidney disease [Diabetes] : no diabetes [FreeTextEntry1] : 10/06/2022 [FreeTextEntry2] : Prostate removal [FreeTextEntry3] : Dr. figueroa [FreeTextEntry4] : 62 y/o male with prostate cancer, HTN, presents for preop clearance. He will be undergoing a robotic prostatectomy on 10/6/22 with Dr. Nath at [facility].\par \par Patient denies any personal history of heart disease or TIMOTHY. Denies chest pain at rest or on exertion, denies shortness of breath. Never smoker. Has not had adverse reactions to anesthesia in the past. States he is able to walk up a flight of stairs without chest pain or dyspnea.\par  [FreeTextEntry7] : 9/27 EKG: NSR, no ST or T wave changes

## 2022-09-29 NOTE — REVIEW OF SYSTEMS
[Fever] : no fever [Chills] : no chills [Chest Pain] : no chest pain [Palpitations] : no palpitations [Lower Ext Edema] : no lower extremity edema [Shortness Of Breath] : no shortness of breath [Cough] : no cough [Abdominal Pain] : no abdominal pain [Nausea] : no nausea [Diarrhea] : no diarrhea [Vomiting] : no vomiting [Dysuria] : no dysuria [Hematuria] : no hematuria [Headache] : no headache [Dizziness] : no dizziness

## 2022-09-29 NOTE — ASSESSMENT
[Patient Optimized for Surgery] : Patient optimized for surgery [FreeTextEntry4] : Preop labs reviewed: CBC, CMP, and PT/INR, PTT - within normal limits\par CXR: no active chest disease\par EKG: NSR, No ST or T wave abnormalities\par \par Patient is low-intermediate risk for planned procedure. Patient is medically optimized at the time of this visit with no modifiable risk factors.\par

## 2022-09-30 ENCOUNTER — OUTPATIENT (OUTPATIENT)
Dept: OUTPATIENT SERVICES | Facility: HOSPITAL | Age: 62
LOS: 1 days | End: 2022-09-30

## 2022-09-30 ENCOUNTER — RESULT REVIEW (OUTPATIENT)
Age: 62
End: 2022-09-30

## 2022-09-30 DIAGNOSIS — C61 MALIGNANT NEOPLASM OF PROSTATE: ICD-10-CM

## 2022-09-30 DIAGNOSIS — H26.9 UNSPECIFIED CATARACT: Chronic | ICD-10-CM

## 2022-09-30 DIAGNOSIS — Z98.890 OTHER SPECIFIED POSTPROCEDURAL STATES: Chronic | ICD-10-CM

## 2022-09-30 PROBLEM — J18.9 PNEUMONIA, UNSPECIFIED ORGANISM: Chronic | Status: ACTIVE | Noted: 2022-09-27

## 2022-09-30 PROBLEM — N40.0 BENIGN PROSTATIC HYPERPLASIA WITHOUT LOWER URINARY TRACT SYMPTOMS: Chronic | Status: ACTIVE | Noted: 2022-09-27

## 2022-09-30 PROBLEM — R97.20 ELEVATED PROSTATE SPECIFIC ANTIGEN [PSA]: Chronic | Status: ACTIVE | Noted: 2022-09-27

## 2022-09-30 PROBLEM — I10 ESSENTIAL (PRIMARY) HYPERTENSION: Chronic | Status: ACTIVE | Noted: 2022-09-27

## 2022-09-30 PROCEDURE — 88321 CONSLTJ&REPRT SLD PREP ELSWR: CPT

## 2022-10-01 DIAGNOSIS — C61 MALIGNANT NEOPLASM OF PROSTATE: ICD-10-CM

## 2022-10-04 LAB — SARS-COV-2 N GENE NPH QL NAA+PROBE: NOT DETECTED

## 2022-10-05 RX ORDER — SODIUM CHLORIDE 9 MG/ML
1000 INJECTION, SOLUTION INTRAVENOUS
Refills: 0 | Status: DISCONTINUED | OUTPATIENT
Start: 2022-10-06 | End: 2022-10-06

## 2022-10-05 RX ORDER — FENTANYL CITRATE 50 UG/ML
50 INJECTION INTRAVENOUS
Refills: 0 | Status: DISCONTINUED | OUTPATIENT
Start: 2022-10-06 | End: 2022-10-06

## 2022-10-05 RX ORDER — OXYCODONE HYDROCHLORIDE 5 MG/1
5 TABLET ORAL ONCE
Refills: 0 | Status: DISCONTINUED | OUTPATIENT
Start: 2022-10-06 | End: 2022-10-06

## 2022-10-05 RX ORDER — ONDANSETRON 8 MG/1
4 TABLET, FILM COATED ORAL ONCE
Refills: 0 | Status: COMPLETED | OUTPATIENT
Start: 2022-10-06 | End: 2022-10-06

## 2022-10-06 ENCOUNTER — INPATIENT (INPATIENT)
Facility: HOSPITAL | Age: 62
LOS: 0 days | Discharge: HOME | DRG: 708 | End: 2022-10-07
Attending: UROLOGY | Admitting: UROLOGY
Payer: COMMERCIAL

## 2022-10-06 ENCOUNTER — APPOINTMENT (OUTPATIENT)
Dept: UROLOGY | Facility: HOSPITAL | Age: 62
End: 2022-10-06

## 2022-10-06 ENCOUNTER — TRANSCRIPTION ENCOUNTER (OUTPATIENT)
Age: 62
End: 2022-10-06

## 2022-10-06 ENCOUNTER — RESULT REVIEW (OUTPATIENT)
Age: 62
End: 2022-10-06

## 2022-10-06 VITALS
OXYGEN SATURATION: 100 % | SYSTOLIC BLOOD PRESSURE: 137 MMHG | DIASTOLIC BLOOD PRESSURE: 84 MMHG | WEIGHT: 196.21 LBS | HEIGHT: 72 IN | TEMPERATURE: 98 F | HEART RATE: 99 BPM | RESPIRATION RATE: 16 BRPM

## 2022-10-06 DIAGNOSIS — Z98.890 OTHER SPECIFIED POSTPROCEDURAL STATES: Chronic | ICD-10-CM

## 2022-10-06 DIAGNOSIS — H26.9 UNSPECIFIED CATARACT: Chronic | ICD-10-CM

## 2022-10-06 DIAGNOSIS — C61 MALIGNANT NEOPLASM OF PROSTATE: ICD-10-CM

## 2022-10-06 LAB
ANION GAP SERPL CALC-SCNC: 4 MMOL/L — LOW (ref 5–17)
BASOPHILS # BLD AUTO: 0.03 K/UL — SIGNIFICANT CHANGE UP (ref 0–0.2)
BASOPHILS NFR BLD AUTO: 0.2 % — SIGNIFICANT CHANGE UP (ref 0–2)
BUN SERPL-MCNC: 22 MG/DL — SIGNIFICANT CHANGE UP (ref 7–23)
CALCIUM SERPL-MCNC: 8.9 MG/DL — SIGNIFICANT CHANGE UP (ref 8.5–10.1)
CHLORIDE SERPL-SCNC: 109 MMOL/L — HIGH (ref 96–108)
CO2 SERPL-SCNC: 26 MMOL/L — SIGNIFICANT CHANGE UP (ref 22–31)
CREAT SERPL-MCNC: 1.14 MG/DL — SIGNIFICANT CHANGE UP (ref 0.5–1.3)
EGFR: 74 ML/MIN/1.73M2 — SIGNIFICANT CHANGE UP
EOSINOPHIL # BLD AUTO: 0 K/UL — SIGNIFICANT CHANGE UP (ref 0–0.5)
EOSINOPHIL NFR BLD AUTO: 0 % — SIGNIFICANT CHANGE UP (ref 0–6)
GLUCOSE SERPL-MCNC: 164 MG/DL — HIGH (ref 70–99)
HCT VFR BLD CALC: 39.3 % — SIGNIFICANT CHANGE UP (ref 39–50)
HGB BLD-MCNC: 13.4 G/DL — SIGNIFICANT CHANGE UP (ref 13–17)
IMM GRANULOCYTES NFR BLD AUTO: 0.4 % — SIGNIFICANT CHANGE UP (ref 0–0.9)
LYMPHOCYTES # BLD AUTO: 0.86 K/UL — LOW (ref 1–3.3)
LYMPHOCYTES # BLD AUTO: 6.2 % — LOW (ref 13–44)
MCHC RBC-ENTMCNC: 31.5 PG — SIGNIFICANT CHANGE UP (ref 27–34)
MCHC RBC-ENTMCNC: 34.1 GM/DL — SIGNIFICANT CHANGE UP (ref 32–36)
MCV RBC AUTO: 92.3 FL — SIGNIFICANT CHANGE UP (ref 80–100)
MONOCYTES # BLD AUTO: 0.22 K/UL — SIGNIFICANT CHANGE UP (ref 0–0.9)
MONOCYTES NFR BLD AUTO: 1.6 % — LOW (ref 2–14)
NEUTROPHILS # BLD AUTO: 12.65 K/UL — HIGH (ref 1.8–7.4)
NEUTROPHILS NFR BLD AUTO: 91.6 % — HIGH (ref 43–77)
PLATELET # BLD AUTO: 190 K/UL — SIGNIFICANT CHANGE UP (ref 150–400)
POTASSIUM SERPL-MCNC: 4 MMOL/L — SIGNIFICANT CHANGE UP (ref 3.5–5.3)
POTASSIUM SERPL-SCNC: 4 MMOL/L — SIGNIFICANT CHANGE UP (ref 3.5–5.3)
RBC # BLD: 4.26 M/UL — SIGNIFICANT CHANGE UP (ref 4.2–5.8)
RBC # FLD: 11.9 % — SIGNIFICANT CHANGE UP (ref 10.3–14.5)
SODIUM SERPL-SCNC: 139 MMOL/L — SIGNIFICANT CHANGE UP (ref 135–145)
WBC # BLD: 13.81 K/UL — HIGH (ref 3.8–10.5)
WBC # FLD AUTO: 13.81 K/UL — HIGH (ref 3.8–10.5)

## 2022-10-06 PROCEDURE — 88307 TISSUE EXAM BY PATHOLOGIST: CPT | Mod: 26

## 2022-10-06 PROCEDURE — 36415 COLL VENOUS BLD VENIPUNCTURE: CPT

## 2022-10-06 PROCEDURE — 88309 TISSUE EXAM BY PATHOLOGIST: CPT | Mod: 26

## 2022-10-06 PROCEDURE — C9399: CPT

## 2022-10-06 PROCEDURE — 85025 COMPLETE CBC W/AUTO DIFF WBC: CPT

## 2022-10-06 PROCEDURE — 80048 BASIC METABOLIC PNL TOTAL CA: CPT

## 2022-10-06 RX ORDER — OXYCODONE HYDROCHLORIDE 5 MG/1
5 TABLET ORAL EVERY 6 HOURS
Refills: 0 | Status: DISCONTINUED | OUTPATIENT
Start: 2022-10-06 | End: 2022-10-07

## 2022-10-06 RX ORDER — ONDANSETRON 8 MG/1
4 TABLET, FILM COATED ORAL EVERY 6 HOURS
Refills: 0 | Status: DISCONTINUED | OUTPATIENT
Start: 2022-10-06 | End: 2022-10-07

## 2022-10-06 RX ORDER — MORPHINE SULFATE 50 MG/1
4 CAPSULE, EXTENDED RELEASE ORAL EVERY 4 HOURS
Refills: 0 | Status: DISCONTINUED | OUTPATIENT
Start: 2022-10-06 | End: 2022-10-07

## 2022-10-06 RX ORDER — CEFAZOLIN SODIUM 1 G
500 VIAL (EA) INJECTION EVERY 8 HOURS
Refills: 0 | Status: DISCONTINUED | OUTPATIENT
Start: 2022-10-06 | End: 2022-10-06

## 2022-10-06 RX ORDER — SENNA PLUS 8.6 MG/1
1 TABLET ORAL AT BEDTIME
Refills: 0 | Status: DISCONTINUED | OUTPATIENT
Start: 2022-10-06 | End: 2022-10-07

## 2022-10-06 RX ORDER — OXYBUTYNIN CHLORIDE 5 MG
5 TABLET ORAL EVERY 8 HOURS
Refills: 0 | Status: DISCONTINUED | OUTPATIENT
Start: 2022-10-06 | End: 2022-10-07

## 2022-10-06 RX ORDER — ACETAMINOPHEN 500 MG
1000 TABLET ORAL ONCE
Refills: 0 | Status: COMPLETED | OUTPATIENT
Start: 2022-10-06 | End: 2022-10-06

## 2022-10-06 RX ORDER — MULTIVIT-MIN/FERROUS GLUCONATE 9 MG/15 ML
1 LIQUID (ML) ORAL
Qty: 0 | Refills: 0 | DISCHARGE

## 2022-10-06 RX ORDER — OXYBUTYNIN CHLORIDE 5 MG
10 TABLET ORAL EVERY 8 HOURS
Refills: 0 | Status: DISCONTINUED | OUTPATIENT
Start: 2022-10-06 | End: 2022-10-06

## 2022-10-06 RX ORDER — SODIUM CHLORIDE 9 MG/ML
1000 INJECTION INTRAMUSCULAR; INTRAVENOUS; SUBCUTANEOUS
Refills: 0 | Status: DISCONTINUED | OUTPATIENT
Start: 2022-10-06 | End: 2022-10-07

## 2022-10-06 RX ORDER — TOBRAMYCIN 0.3 %
1 DROPS OPHTHALMIC (EYE) EVERY 4 HOURS
Refills: 0 | Status: COMPLETED | OUTPATIENT
Start: 2022-10-06 | End: 2022-10-06

## 2022-10-06 RX ORDER — CEFAZOLIN SODIUM 1 G
2000 VIAL (EA) INJECTION EVERY 8 HOURS
Refills: 0 | Status: COMPLETED | OUTPATIENT
Start: 2022-10-06 | End: 2022-10-06

## 2022-10-06 RX ORDER — LISINOPRIL 2.5 MG/1
1 TABLET ORAL
Qty: 0 | Refills: 0 | DISCHARGE

## 2022-10-06 RX ORDER — PANTOPRAZOLE SODIUM 20 MG/1
40 TABLET, DELAYED RELEASE ORAL
Refills: 0 | Status: DISCONTINUED | OUTPATIENT
Start: 2022-10-06 | End: 2022-10-07

## 2022-10-06 RX ORDER — LISINOPRIL 2.5 MG/1
20 TABLET ORAL DAILY
Refills: 0 | Status: DISCONTINUED | OUTPATIENT
Start: 2022-10-06 | End: 2022-10-07

## 2022-10-06 RX ORDER — HEPARIN SODIUM 5000 [USP'U]/ML
5000 INJECTION INTRAVENOUS; SUBCUTANEOUS EVERY 8 HOURS
Refills: 0 | Status: DISCONTINUED | OUTPATIENT
Start: 2022-10-06 | End: 2022-10-07

## 2022-10-06 RX ADMIN — SENNA PLUS 1 TABLET(S): 8.6 TABLET ORAL at 22:51

## 2022-10-06 RX ADMIN — SODIUM CHLORIDE 125 MILLILITER(S): 9 INJECTION, SOLUTION INTRAVENOUS at 11:19

## 2022-10-06 RX ADMIN — Medication 1000 MILLIGRAM(S): at 18:40

## 2022-10-06 RX ADMIN — ONDANSETRON 4 MILLIGRAM(S): 8 TABLET, FILM COATED ORAL at 16:02

## 2022-10-06 RX ADMIN — HEPARIN SODIUM 5000 UNIT(S): 5000 INJECTION INTRAVENOUS; SUBCUTANEOUS at 22:51

## 2022-10-06 RX ADMIN — Medication 100 MILLIGRAM(S): at 16:11

## 2022-10-06 RX ADMIN — Medication 1 DROP(S): at 14:55

## 2022-10-06 RX ADMIN — Medication 1 DROP(S): at 22:51

## 2022-10-06 RX ADMIN — Medication 400 MILLIGRAM(S): at 18:25

## 2022-10-06 RX ADMIN — Medication 5 MILLIGRAM(S): at 15:25

## 2022-10-06 RX ADMIN — Medication 100 MILLIGRAM(S): at 22:50

## 2022-10-06 NOTE — PROGRESS NOTE ADULT - SUBJECTIVE AND OBJECTIVE BOX
60 yo M s/p Robotic assisted radical prostatectomy, doing well. Tolerating clears. Has not yet been OOB. C/o some bladder spasms.     ICU Vital Signs Last 24 Hrs  T(C): 36.4 (06 Oct 2022 11:00), Max: 36.4 (06 Oct 2022 06:13)  T(F): 97.5 (06 Oct 2022 11:00), Max: 97.5 (06 Oct 2022 06:13)  HR: 58 (06 Oct 2022 14:00) (51 - 99)  BP: 122/63 (06 Oct 2022 14:00) (119/59 - 137/84)  BP(mean): --  ABP: --  ABP(mean): --  RR: 12 (06 Oct 2022 14:00) (12 - 17)  SpO2: 98% (06 Oct 2022 14:00) (98% - 100%)    O2 Parameters below as of 06 Oct 2022 14:00  Patient On (Oxygen Delivery Method): room air        general: INAD A&Ox3  lungs: cta b/l  cv: rrr  abd: incisions c/d/i with dermabond. ALMA with 40cc serosanguinous OP in bulb  : UOP satisfactory, cranberry colored      10-06-22 @ 07:01  -  10-06-22 @ 14:33  --------------------------------------------------------  IN: 1000 mL / OUT: 315 mL / NET: 685 mL                          13.4   13.81 )-----------( 190      ( 06 Oct 2022 12:04 )             39.3   10-06    139  |  109<H>  |  22  ----------------------------<  164<H>  4.0   |  26  |  1.14    Ca    8.9      06 Oct 2022 12:04

## 2022-10-06 NOTE — ASU PATIENT PROFILE, ADULT - INTERNATIONAL TRAVEL
Patient awake, alert and oriented to PPT  States 2/10 pain which has improved  Denies shortness of breath  Lungs CTA  Abd soft, NT with +BS, denies N/V or diarrhea  No edema, pulses palpable  SB-SR on the monitor with low HR 48  Bed in lowest position, call bell within reach  No

## 2022-10-07 ENCOUNTER — TRANSCRIPTION ENCOUNTER (OUTPATIENT)
Age: 62
End: 2022-10-07

## 2022-10-07 VITALS
HEART RATE: 64 BPM | OXYGEN SATURATION: 100 % | SYSTOLIC BLOOD PRESSURE: 111 MMHG | DIASTOLIC BLOOD PRESSURE: 64 MMHG | RESPIRATION RATE: 18 BRPM

## 2022-10-07 DIAGNOSIS — C61 MALIGNANT NEOPLASM OF PROSTATE: ICD-10-CM

## 2022-10-07 LAB
ANION GAP SERPL CALC-SCNC: 4 MMOL/L — LOW (ref 5–17)
BASOPHILS # BLD AUTO: 0.01 K/UL — SIGNIFICANT CHANGE UP (ref 0–0.2)
BASOPHILS NFR BLD AUTO: 0.1 % — SIGNIFICANT CHANGE UP (ref 0–2)
BUN SERPL-MCNC: 18 MG/DL — SIGNIFICANT CHANGE UP (ref 7–23)
CALCIUM SERPL-MCNC: 8.5 MG/DL — SIGNIFICANT CHANGE UP (ref 8.5–10.1)
CHLORIDE SERPL-SCNC: 109 MMOL/L — HIGH (ref 96–108)
CO2 SERPL-SCNC: 27 MMOL/L — SIGNIFICANT CHANGE UP (ref 22–31)
CREAT SERPL-MCNC: 1.13 MG/DL — SIGNIFICANT CHANGE UP (ref 0.5–1.3)
EGFR: 74 ML/MIN/1.73M2 — SIGNIFICANT CHANGE UP
EOSINOPHIL # BLD AUTO: 0 K/UL — SIGNIFICANT CHANGE UP (ref 0–0.5)
EOSINOPHIL NFR BLD AUTO: 0 % — SIGNIFICANT CHANGE UP (ref 0–6)
GLUCOSE SERPL-MCNC: 116 MG/DL — HIGH (ref 70–99)
HCT VFR BLD CALC: 37.1 % — LOW (ref 39–50)
HGB BLD-MCNC: 12.4 G/DL — LOW (ref 13–17)
IMM GRANULOCYTES NFR BLD AUTO: 0.2 % — SIGNIFICANT CHANGE UP (ref 0–0.9)
LYMPHOCYTES # BLD AUTO: 1.4 K/UL — SIGNIFICANT CHANGE UP (ref 1–3.3)
LYMPHOCYTES # BLD AUTO: 12.4 % — LOW (ref 13–44)
MCHC RBC-ENTMCNC: 31.1 PG — SIGNIFICANT CHANGE UP (ref 27–34)
MCHC RBC-ENTMCNC: 33.4 GM/DL — SIGNIFICANT CHANGE UP (ref 32–36)
MCV RBC AUTO: 93 FL — SIGNIFICANT CHANGE UP (ref 80–100)
MONOCYTES # BLD AUTO: 1.01 K/UL — HIGH (ref 0–0.9)
MONOCYTES NFR BLD AUTO: 9 % — SIGNIFICANT CHANGE UP (ref 2–14)
NEUTROPHILS # BLD AUTO: 8.81 K/UL — HIGH (ref 1.8–7.4)
NEUTROPHILS NFR BLD AUTO: 78.3 % — HIGH (ref 43–77)
PLATELET # BLD AUTO: 181 K/UL — SIGNIFICANT CHANGE UP (ref 150–400)
POTASSIUM SERPL-MCNC: 4.4 MMOL/L — SIGNIFICANT CHANGE UP (ref 3.5–5.3)
POTASSIUM SERPL-SCNC: 4.4 MMOL/L — SIGNIFICANT CHANGE UP (ref 3.5–5.3)
RBC # BLD: 3.99 M/UL — LOW (ref 4.2–5.8)
RBC # FLD: 11.9 % — SIGNIFICANT CHANGE UP (ref 10.3–14.5)
SODIUM SERPL-SCNC: 140 MMOL/L — SIGNIFICANT CHANGE UP (ref 135–145)
WBC # BLD: 11.25 K/UL — HIGH (ref 3.8–10.5)
WBC # FLD AUTO: 11.25 K/UL — HIGH (ref 3.8–10.5)

## 2022-10-07 PROCEDURE — 99238 HOSP IP/OBS DSCHRG MGMT 30/<: CPT

## 2022-10-07 RX ORDER — OXYCODONE HYDROCHLORIDE 5 MG/1
1 TABLET ORAL
Qty: 12 | Refills: 0
Start: 2022-10-07 | End: 2022-10-09

## 2022-10-07 RX ADMIN — SODIUM CHLORIDE 125 MILLILITER(S): 9 INJECTION INTRAMUSCULAR; INTRAVENOUS; SUBCUTANEOUS at 05:19

## 2022-10-07 RX ADMIN — HEPARIN SODIUM 5000 UNIT(S): 5000 INJECTION INTRAVENOUS; SUBCUTANEOUS at 05:19

## 2022-10-07 RX ADMIN — PANTOPRAZOLE SODIUM 40 MILLIGRAM(S): 20 TABLET, DELAYED RELEASE ORAL at 05:22

## 2022-10-07 RX ADMIN — LISINOPRIL 20 MILLIGRAM(S): 2.5 TABLET ORAL at 08:33

## 2022-10-07 NOTE — DISCHARGE NOTE NURSING/CASE MANAGEMENT/SOCIAL WORK - PATIENT PORTAL LINK FT
You can access the FollowMyHealth Patient Portal offered by [facility] by registering at the following website: [email or web address]. By joining [facility]’s FollowMyHealth portal, you will also be able to view your health information using other applications (apps) compatible with our system.

## 2022-10-07 NOTE — DISCHARGE NOTE PROVIDER - NSDCMRMEDTOKEN_GEN_ALL_CORE_FT
Centrum Adults oral tablet: 1 tab(s) orally once a day  lisinopril 20 mg oral tablet: 1 tab(s) orally once a day  oxyCODONE 5 mg oral tablet: 1 tab(s) orally every 6 hours, As Needed for abdominal pain. MDD:4

## 2022-10-07 NOTE — PROGRESS NOTE ADULT - ASSESSMENT
60 yo M s/p robotic assisted radical prosatectoy, doing well.  ADAT- encourage hydration  OOB encourage ambulation  Strict I/O's to monitor UOP  daily labs  Dvt ppx  Pain control  Ditropan ordered prn for bladder spasms  IV ancef x 2 doses postop  
A/P: 61y Male POD 1 s/p RA radical prostatectomy  ADAT  Strict I&O's  Pain controlled  DVT prophylaxis/OOB  Encourage Incentive spirometry

## 2022-10-07 NOTE — DISCHARGE NOTE PROVIDER - HOSPITAL COURSE
Admitted s/p robotic prostatectomy. Postop labs stable, good UO. Tolerated diet and ambulated. ALMA removed prior to discharge. Discharged with benitez to followup with Dr Nath.

## 2022-10-07 NOTE — DISCHARGE NOTE PROVIDER - NSDCFUADDINST_GEN_ALL_CORE_FT
Remove gauze from old ALMA site tomorrow.  May replace gauze if oozing.  Showering ok  No heavy lifting  No driving if taking pain medication  Follow up with Dr Nath

## 2022-10-07 NOTE — DISCHARGE NOTE PROVIDER - CARE PROVIDER_API CALL
Oliver Nath)  Urology  [address], [address] [place] [address]  Phone: [phone]  Fax: [phone]  Established Patient  Follow Up Time:

## 2022-10-07 NOTE — PROGRESS NOTE ADULT - SUBJECTIVE AND OBJECTIVE BOX
61y Male POD 1 s/p RA radical prostatectomy evaluated at AM rounds, pt reports feeling well, tolerating diet, ambulatory. Pt had 1 episode of emesis while in PACU but has not been nauseas or having abdominal pain since PACU. Denies CP, palpitation, SOB.    heparin   Injectable 5000 Unit(s) SubCutaneous every 8 hours  lisinopril 20 milliGRAM(s) Oral daily  morphine  - Injectable 4 milliGRAM(s) IV Push every 4 hours PRN  ondansetron Injectable 4 milliGRAM(s) IV Push every 6 hours PRN  oxybutynin 5 milliGRAM(s) Oral every 8 hours PRN  oxyCODONE    IR 5 milliGRAM(s) Oral every 6 hours PRN  pantoprazole    Tablet 40 milliGRAM(s) Oral before breakfast  senna 1 Tablet(s) Oral at bedtime  sodium chloride 0.9%. 1000 milliLiter(s) IV Continuous <Continuous>      Vital Signs Last 24 Hrs  T(C): 37.4 (06 Oct 2022 19:09), Max: 37.4 (06 Oct 2022 19:09)  T(F): 99.3 (06 Oct 2022 19:09), Max: 99.3 (06 Oct 2022 19:09)  HR: 63 (06 Oct 2022 19:09) (51 - 73)  BP: 127/60 (06 Oct 2022 19:09) (119/59 - 135/71)  BP(mean): --  RR: 18 (06 Oct 2022 19:09) (12 - 21)  SpO2: 98% (06 Oct 2022 19:09) (98% - 100%)    Parameters below as of 06 Oct 2022 19:09  Patient On (Oxygen Delivery Method): room air        I&O's Summary    06 Oct 2022 07:01  -  07 Oct 2022 07:00  --------------------------------------------------------  IN: 2350 mL / OUT: 1940 mL / NET: 410 mL        Physical Exam  Gen: NAD, comfortable in bed  Neuro: A&Ox3  Lungs: CTAB  CV: S1/S2, RRR  Abd: Soft, ND, + incisional tenderness, no rebound no guarding.  +ALMA in place with +10 cc serosanguinous drainage      : benitez in place + cc   Extremities: Venodynes in place. No LE edema bl                          12.4   11.25 )-----------( 181      ( 07 Oct 2022 05:11 )             37.1       10-07    140  |  109<H>  |  18  ----------------------------<  116<H>  4.4   |  27  |  1.13    Ca    8.5      07 Oct 2022 05:11             61y Male POD 1 s/p RA radical prostatectomy evaluated at AM rounds, pt reports feeling well, tolerating diet, ambulatory. Pt had 1 episode of emesis while in PACU but has not been nauseas or having abdominal pain since PACU. Denies CP, palpitation, SOB.    heparin   Injectable 5000 Unit(s) SubCutaneous every 8 hours  lisinopril 20 milliGRAM(s) Oral daily  morphine  - Injectable 4 milliGRAM(s) IV Push every 4 hours PRN  ondansetron Injectable 4 milliGRAM(s) IV Push every 6 hours PRN  oxybutynin 5 milliGRAM(s) Oral every 8 hours PRN  oxyCODONE    IR 5 milliGRAM(s) Oral every 6 hours PRN  pantoprazole    Tablet 40 milliGRAM(s) Oral before breakfast  senna 1 Tablet(s) Oral at bedtime  sodium chloride 0.9%. 1000 milliLiter(s) IV Continuous <Continuous>      Vital Signs Last 24 Hrs  T(C): 37.4 (06 Oct 2022 19:09), Max: 37.4 (06 Oct 2022 19:09)  T(F): 99.3 (06 Oct 2022 19:09), Max: 99.3 (06 Oct 2022 19:09)  HR: 63 (06 Oct 2022 19:09) (51 - 73)  BP: 127/60 (06 Oct 2022 19:09) (119/59 - 135/71)  BP(mean): --  RR: 18 (06 Oct 2022 19:09) (12 - 21)  SpO2: 98% (06 Oct 2022 19:09) (98% - 100%)    Parameters below as of 06 Oct 2022 19:09  Patient On (Oxygen Delivery Method): room air        I&O's Summary    06 Oct 2022 07:01  -  07 Oct 2022 07:00  --------------------------------------------------------  IN: 2350 mL / OUT: 1940 mL / NET: 410 mL        Physical Exam  Gen: NAD, comfortable in bed  Neuro: A&Ox3  Lungs: CTAB  CV: S1/S2, RRR  Abd: Soft, ND, + incisional tenderness, no rebound no guarding.  +ALMA in place with +50 cc serosanguinous drainage      : benitez in place + cc   Extremities: Venodynes in place. No LE edema bl                          12.4   11.25 )-----------( 181      ( 07 Oct 2022 05:11 )             37.1       10-07    140  |  109<H>  |  18  ----------------------------<  116<H>  4.4   |  27  |  1.13    Ca    8.5      07 Oct 2022 05:11

## 2022-10-07 NOTE — DISCHARGE NOTE PROVIDER - NSDCFUSCHEDAPPT_GEN_ALL_CORE_FT
[facility] Physician [facility]  UROLOGY [address]  Scheduled Appointment: 10/14/2022

## 2022-10-10 ENCOUNTER — NON-APPOINTMENT (OUTPATIENT)
Age: 62
End: 2022-10-10

## 2022-10-14 DIAGNOSIS — C61 MALIGNANT NEOPLASM OF PROSTATE: ICD-10-CM

## 2022-10-14 DIAGNOSIS — I10 ESSENTIAL (PRIMARY) HYPERTENSION: ICD-10-CM

## 2022-10-14 DIAGNOSIS — R97.20 ELEVATED PROSTATE SPECIFIC ANTIGEN [PSA]: ICD-10-CM

## 2022-10-17 ENCOUNTER — APPOINTMENT (OUTPATIENT)
Dept: UROLOGY | Facility: CLINIC | Age: 62
End: 2022-10-17

## 2022-10-17 PROCEDURE — 99024 POSTOP FOLLOW-UP VISIT: CPT

## 2022-10-17 NOTE — PHYSICAL EXAM
[Normal Appearance] : normal appearance [Well Groomed] : well groomed [General Appearance - In No Acute Distress] : no acute distress [Abdomen Soft] : soft [Urethral Meatus] : meatus normal [Skin Color & Pigmentation] : normal skin color and pigmentation [Edema] : no peripheral edema [] : no respiratory distress [Oriented To Time, Place, And Person] : oriented to person, place, and time [Affect] : the affect was normal [Normal Station and Gait] : the gait and station were normal for the patient's age [No Focal Deficits] : no focal deficits [No Palpable Adenopathy] : no palpable adenopathy [FreeTextEntry1] : abdominal trochar sites w/o incident.

## 2022-10-17 NOTE — HISTORY OF PRESENT ILLNESS
[FreeTextEntry1] : 60 yo presents today for a 1 week POA s/p RALP 10.6.22.\par Pt. is here for his first post-op assessement. \par Bates to leg bag. \par Good appetite. No complications since his surgery.\par Here to review his surgical pathology and for his catheter removal. \par

## 2022-10-17 NOTE — ASSESSMENT
[FreeTextEntry1] : Surgical pathology reviewed with patient. \par GS 7 (3+4)\par Adenocarcinoma extends to the right apical margin and the right posterior margin. Also invads the extraprostatic tissue. Perineural invasion present.\par All lymph nodes negative for metastatic carcinoma. \par Both verbal and written instructions for Kegel exercises given to patient. \par Discussed activity restrictions.\par PSA in 6 weeks.\par Next follow-up appointment in 6 weeks.

## 2022-11-10 LAB — PSA SERPL-MCNC: 0.05 NG/ML

## 2022-11-14 ENCOUNTER — APPOINTMENT (OUTPATIENT)
Dept: UROLOGY | Facility: CLINIC | Age: 62
End: 2022-11-14

## 2022-11-14 PROCEDURE — 99024 POSTOP FOLLOW-UP VISIT: CPT

## 2022-11-18 NOTE — HISTORY OF PRESENT ILLNESS
[None] : no symptoms [FreeTextEntry1] : 61 yo presents today for a follow-up appointment for prostate cancer.\par s/p RALP 10.6.22.\par Recent PSA 0.05 (11.10.22.)

## 2022-11-18 NOTE — PHYSICAL EXAM
[General Appearance - Well Developed] : well developed [General Appearance - Well Nourished] : well nourished [Normal Appearance] : normal appearance [Well Groomed] : well groomed [General Appearance - In No Acute Distress] : no acute distress [Abdomen Soft] : soft [Abdomen Tenderness] : non-tender [Costovertebral Angle Tenderness] : no ~M costovertebral angle tenderness [FreeTextEntry1] : scars healed [Urethral Meatus] : meatus normal [Urinary Bladder Findings] : the bladder was normal on palpation [Scrotum] : the scrotum was normal [Testes Mass (___cm)] : there were no testicular masses [Edema] : no peripheral edema [] : no respiratory distress [Respiration, Rhythm And Depth] : normal respiratory rhythm and effort [Exaggerated Use Of Accessory Muscles For Inspiration] : no accessory muscle use [Oriented To Time, Place, And Person] : oriented to person, place, and time [Affect] : the affect was normal [Mood] : the mood was normal [Not Anxious] : not anxious [Normal Station and Gait] : the gait and station were normal for the patient's age [No Focal Deficits] : no focal deficits [No Palpable Adenopathy] : no palpable adenopathy

## 2023-01-19 LAB — PSA SERPL-MCNC: 0.04 NG/ML

## 2023-01-23 ENCOUNTER — APPOINTMENT (OUTPATIENT)
Dept: UROLOGY | Facility: CLINIC | Age: 63
End: 2023-01-23
Payer: COMMERCIAL

## 2023-01-23 PROCEDURE — 99213 OFFICE O/P EST LOW 20 MIN: CPT

## 2023-01-23 RX ORDER — TADALAFIL 20 MG/1
20 TABLET ORAL
Qty: 30 | Refills: 2 | Status: ACTIVE | COMMUNITY
Start: 2023-01-23 | End: 1900-01-01

## 2023-01-23 RX ORDER — TADALAFIL 5 MG/1
5 TABLET ORAL
Qty: 90 | Refills: 3 | Status: DISCONTINUED | COMMUNITY
Start: 2022-11-14 | End: 2023-01-23

## 2023-01-23 NOTE — ASSESSMENT
[FreeTextEntry1] : PSA 0.04 down from 0.05\par had extraprostatic disease\par He is on tadalafil 5 mg. increased to 20 mg as needed. \par Medication explained to patient w/ proper usage. \par Denies any urinary leakage.\par No longer wears pads. \par Repeat PSA in 3 months. \par No need for adjuvant radiation yet but he may need it in the future    we discussed this\par Next follow-up appointment in 3 months. \par

## 2023-01-23 NOTE — HISTORY OF PRESENT ILLNESS
[None] : no symptoms [FreeTextEntry1] : 63 yo presents today for a follow-up appointment for prostate cancer.\par s/p RALP 10.6.22.\par Recent PSA 0.04 (1.18.23.)\par Pt. is doing well overall. \par No acute urological issues at time of visit.

## 2023-01-23 NOTE — PHYSICAL EXAM
[General Appearance - Well Developed] : well developed [General Appearance - Well Nourished] : well nourished [Normal Appearance] : normal appearance [Well Groomed] : well groomed [General Appearance - In No Acute Distress] : no acute distress [Abdomen Soft] : soft [Abdomen Tenderness] : non-tender [Costovertebral Angle Tenderness] : no ~M costovertebral angle tenderness [Urethral Meatus] : meatus normal [Urinary Bladder Findings] : the bladder was normal on palpation [Scrotum] : the scrotum was normal [Testes Mass (___cm)] : there were no testicular masses [Edema] : no peripheral edema [] : no respiratory distress [Respiration, Rhythm And Depth] : normal respiratory rhythm and effort [Exaggerated Use Of Accessory Muscles For Inspiration] : no accessory muscle use [Oriented To Time, Place, And Person] : oriented to person, place, and time [Affect] : the affect was normal [Mood] : the mood was normal [Not Anxious] : not anxious [Normal Station and Gait] : the gait and station were normal for the patient's age [No Focal Deficits] : no focal deficits [No Palpable Adenopathy] : no palpable adenopathy [FreeTextEntry1] : scars healed

## 2023-04-20 LAB — PSA SERPL-MCNC: 0.11 NG/ML

## 2023-04-24 ENCOUNTER — APPOINTMENT (OUTPATIENT)
Dept: UROLOGY | Facility: CLINIC | Age: 63
End: 2023-04-24
Payer: COMMERCIAL

## 2023-04-24 PROCEDURE — 99214 OFFICE O/P EST MOD 30 MIN: CPT

## 2023-04-27 ENCOUNTER — OUTPATIENT (OUTPATIENT)
Dept: OUTPATIENT SERVICES | Facility: HOSPITAL | Age: 63
LOS: 1 days | Discharge: HOME | End: 2023-04-27
Payer: COMMERCIAL

## 2023-04-27 DIAGNOSIS — H26.9 UNSPECIFIED CATARACT: Chronic | ICD-10-CM

## 2023-04-27 DIAGNOSIS — Z98.890 OTHER SPECIFIED POSTPROCEDURAL STATES: Chronic | ICD-10-CM

## 2023-05-02 ENCOUNTER — APPOINTMENT (OUTPATIENT)
Dept: RADIATION ONCOLOGY | Facility: CLINIC | Age: 63
End: 2023-05-02
Payer: COMMERCIAL

## 2023-05-02 VITALS
RESPIRATION RATE: 16 BRPM | OXYGEN SATURATION: 97 % | DIASTOLIC BLOOD PRESSURE: 78 MMHG | SYSTOLIC BLOOD PRESSURE: 145 MMHG | HEART RATE: 88 BPM | WEIGHT: 202 LBS | BODY MASS INDEX: 27.4 KG/M2

## 2023-05-02 PROCEDURE — 99204 OFFICE O/P NEW MOD 45 MIN: CPT | Mod: 25

## 2023-05-02 NOTE — REASON FOR VISIT
[Consideration of Curative Therapy] : consideration of curative therapy for prostate cancer [Other: _____] : [unfilled]

## 2023-05-03 NOTE — PHYSICAL EXAM
[Normal] : oriented to person, place and time, the affect was normal, the mood was normal and not anxious [de-identified] : deferred [FreeTextEntry1] : deferred

## 2023-05-03 NOTE — PHYSICAL EXAM
[Normal] : oriented to person, place and time, the affect was normal, the mood was normal and not anxious [de-identified] : deferred [FreeTextEntry1] : deferred

## 2023-05-03 NOTE — REVIEW OF SYSTEMS
[Negative] : Allergic/Immunologic [IPSS Score (0-40): ___] : IPSS score: [unfilled] [EPIC-CP Score (0-60): ___] : EPIC-CP score: [unfilled]

## 2023-05-03 NOTE — HISTORY OF PRESENT ILLNESS
[FreeTextEntry1] : 62 year old man s/p RALP for prostate cancer presents today for consultation regarding salvage radiation therapy for prostate carcinoma. Presented with a rising PSA (see below) prompting a biopsy which demomstrated Fordyce Score 6 and 7 in half the cores all on the right side. Underwent a RALP by Dr. Nath 10/14/22. PSA alex was 0.05 in Nov 2022 and in April 2023 is 0.11.\par \par He met with Dr Jamey Brian on 6/15/22\par 7/2019 - 2.02\par 6/2020 - 2.39\par 6/9/2021 - 4.00\par 6/9/2022 - 6.98\par 10/10/22 0.05\par \par 6/28/22 MRI pelvis:\par FINDINGS:\par \par Size: 4.5 x 3.4 x 4.3 [transverse x AP x CC] cm.\par Volume: 35 mL .\par PSA density: 0.20 ng/mL/mL\par PSA density >0.15 ng/mL/mL: Yes\par Hemorrhage: None.\par \par Central gland: Enlarged prostate gland with central prostatic hypertrophy.\par Peripheral zone: Lesion as described below.\par \par LESION: #1\par Location: Right, posterior medial (PZpm), midgland, peripheral zone.\par Slice#: Series 9, Image 15.\par Size (transverse, AP, CC): 9 x 7 x 9 mm.\par T2-WI: 4 - Circumscribed, homogenous moderate hypointense focus/mass confined to the prostate; less than 1.5 cm in greatest dimension.\par DWI: 3 - Focal (discrete and different from the background) hypointense on ADC and/or focal hyperintense on high b-value DWI; may be markedly hypointense on ADC or markedly hyperintense on high b-value DWI, but not both.\par DCE: Positive - focal, and; earlier than enhancement of adjacent normal prostatic tissues, and; corresponds to suspicious finding on T2W and/or DWI.\par Extra-prostatic extension: Abutment, tumor abuts but does not deform capsule.\par PI-RADS Assessment Category: 4, High\par \par Neurovascular bundle: No evidence of neurovascular bundle invasion.\par Seminal vesicles: No seminal vesicle invasion.\par Lymph nodes: No pelvic adenopathy.\par Bones: No suspicious lesions identified.\par Urinary bladder: Unremarkable.\par Other: None.\par \par IMPRESSION:\par Right, posterior medial (PZpm), midgland, peripheral zone lesion as detailed above.\par PIRADS 4 - High (clinically significant cancer is likely to be present)\par \par No extraprostatic extension, seminal vesicle invasion, or pelvic lymphadenopathy.\par \par Prostate Volume: 35 mL\par PSA density: 0.20 ng/mL/mL\par \par 8/29/22 CT abd/pel:\par \par IMPRESSION: No retrocrural or retroperitoneal adenopathy.\par \par 8/29/22 bone scan:\par IMPRESSION: No radionuclide evidence of osseous metastases.\par \par \par He met with Dr Oliver Nath \par 7/27/22 prostate biopsy:\par 1. targeted lesion 1 adenocarcinoma prognostic group 2 (Fordyce 3+4=7) involving 50% 10% and 5% (6.5mm,1.5mm and 1mm in length) of 3/3 cores Fordyce pattern 4 comprises 10% of tumor\par 2. Right lateral adenocarcinoma prognostic group 3 (Cait 4+3=7) involving 60% 40% (4.5mm & 4mm  in length) of 2/2 cores Cait pattern 4 comprises 60% of tumor. perineural invasion identified \par 3. right posterior lateral aspect adenocarcinoma prognostic group 2 (Fordyce 3+4=7) involving 80% (5.5mm  in length) of 1/1 cores Fordyce pattern 4 comprises 10% of tumor. \par 4. right posterior lateral base adenocarcinoma prognostic group 2 (Cait 3+4=7) involving 90% (8mm  in length) of 1/1 cores Cait pattern 4 comprises 10% of tumor. \par 5. Right posterior medial apex adenocarcinoma prognostic group 1 (Cait 3+3=6) involving <5%(0.5mm  in length) of 1/1 cores \par 6. right posterior medial base: benign\par 7. right anterior:  benign\par 8. left lateral :  benign\par 9. left posterior  benign\par 10. left posterior lateral base  benign\par 11. left posterior medial apex: benign\par 12. left posterior medial base: benign\par 13. left anterior:  benign\par \par \par 10/14/22 prostatectomy:\par 1  Prostate and seminal vesicles\par 2  Right pelvic lymph nodes packet\par 3  Left pelvic lymph nodes packet\par \par Final Diagnosis\par 1.  Prostate and seminal vesicles, excision:\par -Prostatic adenocarcinoma, Fordyce score 3+4 = 7/10, Fordyce grade group 2\par \par -Adenocarcinoma extends to the right apical margin and the right posterior\par margin\par -Adenocarcinoma invades into extraprostatic tissue, focally\par -Perineural invasion is present\par -Lymphovascular space invasion is not identified\par -The seminal vesicles are negative\par \par 2.  Lymph nodes, right pelvic, excision:\par -5 lymph nodes, negative for metastatic carcinoma\par \par 3.  Lymph nodes, left pelvic, excision:\par -6 lymph nodes, negative for metastatic carcinoma\par \par  PSA\par 11/10/22 0.05\par 1/18/23 0.04\par 4/20/23 0.11\par \par 05/2/2023 pt denies nocturia, dysuria, urgency, hesitancy, hematuria, dribbling, change in bowel, bony pain,fatigue & weight loss\par \par Dr Nath 4/24/23\par \par \par \par

## 2023-05-03 NOTE — VITALS
[Least Pain Intensity: 0/10] : 0/10 [90: Able to carry normal activity; minor signs or symptoms of disease.] : 90: Able to carry normal activity; minor signs or symptoms of disease.  [Date: ____________] : Patient's last distress assessment performed on [unfilled]. [0 - No Distress] : Distress Level: 0 [Maximal Pain Intensity: 0/10] : 0/10 [ECOG Performance Status: 1 - Restricted in physically strenuous activity but ambulatory and able to carry out work of a light or sedentary nature] : Performance Status: 1 - Restricted in physically strenuous activity but ambulatory and able to carry out work of a light or sedentary nature, e.g., light house work, office work

## 2023-05-03 NOTE — LETTER CLOSING
[Consult Closing:] : Thank you for allowing me to participate in the care of this patient.  If you have any questions, please do not hesitate to contact me. [Sincerely yours,] : Sincerely yours, [FreeTextEntry3] : Gary Adler MD\par Physician in Chief\par Department of Radiation Medicine\par [facility] [place]\par [facility] [place]\par \par  of Radiation Medicine\par [facility]\par at  hospitals/[facility]\par \par Radiation \par [facility]/\par [facility] [place]\par [address]\par [place], [place] [address]\par \par Tel: [phone]\par Fax: [phone]\par

## 2023-05-03 NOTE — HISTORY OF PRESENT ILLNESS
[FreeTextEntry1] : 62 year old man s/p RALP for prostate cancer presents today for consultation regarding salvage radiation therapy for prostate carcinoma. Presented with a rising PSA (see below) prompting a biopsy which demomstrated Eva Score 6 and 7 in half the cores all on the right side. Underwent a RALP by Dr. Nath 10/14/22. PSA alex was 0.05 in Nov 2022 and in April 2023 is 0.11.\par \par He met with Dr Jamey Brian on 6/15/22\par 7/2019 - 2.02\par 6/2020 - 2.39\par 6/9/2021 - 4.00\par 6/9/2022 - 6.98\par 10/10/22 0.05\par \par 6/28/22 MRI pelvis:\par FINDINGS:\par \par Size: 4.5 x 3.4 x 4.3 [transverse x AP x CC] cm.\par Volume: 35 mL .\par PSA density: 0.20 ng/mL/mL\par PSA density >0.15 ng/mL/mL: Yes\par Hemorrhage: None.\par \par Central gland: Enlarged prostate gland with central prostatic hypertrophy.\par Peripheral zone: Lesion as described below.\par \par LESION: #1\par Location: Right, posterior medial (PZpm), midgland, peripheral zone.\par Slice#: Series 9, Image 15.\par Size (transverse, AP, CC): 9 x 7 x 9 mm.\par T2-WI: 4 - Circumscribed, homogenous moderate hypointense focus/mass confined to the prostate; less than 1.5 cm in greatest dimension.\par DWI: 3 - Focal (discrete and different from the background) hypointense on ADC and/or focal hyperintense on high b-value DWI; may be markedly hypointense on ADC or markedly hyperintense on high b-value DWI, but not both.\par DCE: Positive - focal, and; earlier than enhancement of adjacent normal prostatic tissues, and; corresponds to suspicious finding on T2W and/or DWI.\par Extra-prostatic extension: Abutment, tumor abuts but does not deform capsule.\par PI-RADS Assessment Category: 4, High\par \par Neurovascular bundle: No evidence of neurovascular bundle invasion.\par Seminal vesicles: No seminal vesicle invasion.\par Lymph nodes: No pelvic adenopathy.\par Bones: No suspicious lesions identified.\par Urinary bladder: Unremarkable.\par Other: None.\par \par IMPRESSION:\par Right, posterior medial (PZpm), midgland, peripheral zone lesion as detailed above.\par PIRADS 4 - High (clinically significant cancer is likely to be present)\par \par No extraprostatic extension, seminal vesicle invasion, or pelvic lymphadenopathy.\par \par Prostate Volume: 35 mL\par PSA density: 0.20 ng/mL/mL\par \par 8/29/22 CT abd/pel:\par \par IMPRESSION: No retrocrural or retroperitoneal adenopathy.\par \par 8/29/22 bone scan:\par IMPRESSION: No radionuclide evidence of osseous metastases.\par \par \par He met with Dr Oliver Nath \par 7/27/22 prostate biopsy:\par 1. targeted lesion 1 adenocarcinoma prognostic group 2 (Eva 3+4=7) involving 50% 10% and 5% (6.5mm,1.5mm and 1mm in length) of 3/3 cores Eva pattern 4 comprises 10% of tumor\par 2. Right lateral adenocarcinoma prognostic group 3 (Cait 4+3=7) involving 60% 40% (4.5mm & 4mm  in length) of 2/2 cores Cait pattern 4 comprises 60% of tumor. perineural invasion identified \par 3. right posterior lateral aspect adenocarcinoma prognostic group 2 (Eva 3+4=7) involving 80% (5.5mm  in length) of 1/1 cores Eva pattern 4 comprises 10% of tumor. \par 4. right posterior lateral base adenocarcinoma prognostic group 2 (Cait 3+4=7) involving 90% (8mm  in length) of 1/1 cores Cait pattern 4 comprises 10% of tumor. \par 5. Right posterior medial apex adenocarcinoma prognostic group 1 (Cait 3+3=6) involving <5%(0.5mm  in length) of 1/1 cores \par 6. right posterior medial base: benign\par 7. right anterior:  benign\par 8. left lateral :  benign\par 9. left posterior  benign\par 10. left posterior lateral base  benign\par 11. left posterior medial apex: benign\par 12. left posterior medial base: benign\par 13. left anterior:  benign\par \par \par 10/14/22 prostatectomy:\par 1  Prostate and seminal vesicles\par 2  Right pelvic lymph nodes packet\par 3  Left pelvic lymph nodes packet\par \par Final Diagnosis\par 1.  Prostate and seminal vesicles, excision:\par -Prostatic adenocarcinoma, Eva score 3+4 = 7/10, Eva grade group 2\par \par -Adenocarcinoma extends to the right apical margin and the right posterior\par margin\par -Adenocarcinoma invades into extraprostatic tissue, focally\par -Perineural invasion is present\par -Lymphovascular space invasion is not identified\par -The seminal vesicles are negative\par \par 2.  Lymph nodes, right pelvic, excision:\par -5 lymph nodes, negative for metastatic carcinoma\par \par 3.  Lymph nodes, left pelvic, excision:\par -6 lymph nodes, negative for metastatic carcinoma\par \par  PSA\par 11/10/22 0.05\par 1/18/23 0.04\par 4/20/23 0.11\par \par 05/2/2023 pt denies nocturia, dysuria, urgency, hesitancy, hematuria, dribbling, change in bowel, bony pain,fatigue & weight loss\par \par Dr Nath 4/24/23\par \par \par \par

## 2023-05-03 NOTE — PHYSICAL EXAM
[Normal] : oriented to person, place and time, the affect was normal, the mood was normal and not anxious [de-identified] : deferred [FreeTextEntry1] : deferred

## 2023-05-03 NOTE — HISTORY OF PRESENT ILLNESS
[FreeTextEntry1] : 62 year old man s/p RALP for prostate cancer presents today for consultation regarding salvage radiation therapy for prostate carcinoma. Presented with a rising PSA (see below) prompting a biopsy which demomstrated [place] Score 6 and 7 in half the cores all on the right side. Underwent a RALP by Dr. Nath 10/14/22. PSA alex was 0.05 in Nov 2022 and in April 2023 is 0.11.\par \par He met with Dr Jamey Brina on 6/15/22\par 7/2019 - 2.02\par 6/2020 - 2.39\par 6/9/2021 - 4.00\par 6/9/2022 - 6.98\par 10/10/22 0.05\par \par 6/28/22 MRI pelvis:\par FINDINGS:\par \par Size: 4.5 x 3.4 x 4.3 [transverse x AP x CC] cm.\par Volume: 35 mL .\par PSA density: 0.20 ng/mL/mL\par PSA density >0.15 ng/mL/mL: Yes\par Hemorrhage: None.\par \par Central gland: Enlarged prostate gland with central prostatic hypertrophy.\par Peripheral zone: Lesion as described below.\par \par LESION: #1\par Location: Right, posterior medial (PZpm), midgland, peripheral zone.\par Slice#: Series 9, Image 15.\par Size (transverse, AP, CC): 9 x 7 x 9 mm.\par T2-WI: 4 - Circumscribed, homogenous moderate hypointense focus/mass confined to the prostate; less than 1.5 cm in greatest dimension.\par DWI: 3 - Focal (discrete and different from the background) hypointense on ADC and/or focal hyperintense on high b-value DWI; may be markedly hypointense on ADC or markedly hyperintense on high b-value DWI, but not both.\par DCE: Positive - focal, and; earlier than enhancement of adjacent normal prostatic tissues, and; corresponds to suspicious finding on T2W and/or DWI.\par Extra-prostatic extension: Abutment, tumor abuts but does not deform capsule.\par PI-RADS Assessment Category: 4, High\par \par Neurovascular bundle: No evidence of neurovascular bundle invasion.\par Seminal vesicles: No seminal vesicle invasion.\par Lymph nodes: No pelvic adenopathy.\par Bones: No suspicious lesions identified.\par Urinary bladder: Unremarkable.\par Other: None.\par \par IMPRESSION:\par Right, posterior medial (PZpm), midgland, peripheral zone lesion as detailed above.\par PIRADS 4 - High (clinically significant cancer is likely to be present)\par \par No extraprostatic extension, seminal vesicle invasion, or pelvic lymphadenopathy.\par \par Prostate Volume: 35 mL\par PSA density: 0.20 ng/mL/mL\par \par 8/29/22 CT abd/pel:\par \par IMPRESSION: No retrocrural or retroperitoneal adenopathy.\par \par 8/29/22 bone scan:\par IMPRESSION: No radionuclide evidence of osseous metastases.\par \par \par He met with Dr Oliver Nath \par 7/27/22 prostate biopsy:\par 1. targeted lesion 1 adenocarcinoma prognostic group 2 ([place] 3+4=7) involving 50% 10% and 5% (6.5mm,1.5mm and 1mm in length) of 3/3 cores [place] pattern 4 comprises 10% of tumor\par 2. Right lateral adenocarcinoma prognostic group 3 (Cait 4+3=7) involving 60% 40% (4.5mm & 4mm  in length) of 2/2 cores Cait pattern 4 comprises 60% of tumor. perineural invasion identified \par 3. right posterior lateral aspect adenocarcinoma prognostic group 2 ([place] 3+4=7) involving 80% (5.5mm  in length) of 1/1 cores [place] pattern 4 comprises 10% of tumor. \par 4. right posterior lateral base adenocarcinoma prognostic group 2 (Cait 3+4=7) involving 90% (8mm  in length) of 1/1 cores Cait pattern 4 comprises 10% of tumor. \par 5. Right posterior medial apex adenocarcinoma prognostic group 1 (Cait 3+3=6) involving <5%(0.5mm  in length) of 1/1 cores \par 6. right posterior medial base: benign\par 7. right anterior:  benign\par 8. left lateral :  benign\par 9. left posterior  benign\par 10. left posterior lateral base  benign\par 11. left posterior medial apex: benign\par 12. left posterior medial base: benign\par 13. left anterior:  benign\par \par \par 10/14/22 prostatectomy:\par 1  Prostate and seminal vesicles\par 2  Right pelvic lymph nodes packet\par 3  Left pelvic lymph nodes packet\par \par Final Diagnosis\par 1.  Prostate and seminal vesicles, excision:\par -Prostatic adenocarcinoma, [place] score 3+4 = 7/10, [place] grade group 2\par \par -Adenocarcinoma extends to the right apical margin and the right posterior\par margin\par -Adenocarcinoma invades into extraprostatic tissue, focally\par -Perineural invasion is present\par -Lymphovascular space invasion is not identified\par -The seminal vesicles are negative\par \par 2.  Lymph nodes, right pelvic, excision:\par -5 lymph nodes, negative for metastatic carcinoma\par \par 3.  Lymph nodes, left pelvic, excision:\par -6 lymph nodes, negative for metastatic carcinoma\par \par  PSA\par 11/10/22 0.05\par 1/18/23 0.04\par 4/20/23 0.11\par \par 05/2/2023 pt denies nocturia, dysuria, urgency, hesitancy, hematuria, dribbling, change in bowel, bony pain,fatigue & weight loss\par \par Dr Nath 4/24/23\par \par \par \par

## 2023-05-03 NOTE — DISEASE MANAGEMENT
[Clinical] : TNM Stage: c [0-10] : 0 -10 ng/mL [Biopsy with Fusion] : Patient had a biopsy with fusion on [] : Patient had a bone scan [1] : T1 [c] : c [0] : M0 [7(3+4)] : Fusion Biopsy [place] Score: 7(3+4) [IIB] : IIB [BiopsyDate] : 7/27/22 [TotalCores] : 17 [MeasuredProstateVolume] : 35 [TotalPositiveCores] : 8 [MaxCoreInvolvement] : 90 [CTresults] : no adenopathy [BoneScanResults] : no osseous metastasis  [FreeTextEntry4] : adenocarcinoma of the prostate [TTNM] : 1c [NTNM] : 0 [MTNM] : 0

## 2023-05-03 NOTE — DISEASE MANAGEMENT
[Clinical] : TNM Stage: c [0-10] : 0 -10 ng/mL [Biopsy with Fusion] : Patient had a biopsy with fusion on [] : Patient had a bone scan [1] : T1 [c] : c [0] : M0 [7(3+4)] : Fusion Biopsy [place] Score: 7(3+4) [IIB] : IIB [BiopsyDate] : 7/27/22 [MeasuredProstateVolume] : 35 [TotalCores] : 17 [TotalPositiveCores] : 8 [MaxCoreInvolvement] : 90 [CTresults] : no adenopathy [BoneScanResults] : no osseous metastasis  [FreeTextEntry4] : adenocarcinoma of the prostate [TTNM] : 1c [NTNM] : 0 [MTNM] : 0

## 2023-05-03 NOTE — LETTER CLOSING
[Consult Closing:] : Thank you for allowing me to participate in the care of this patient.  If you have any questions, please do not hesitate to contact me. [Sincerely yours,] : Sincerely yours, [FreeTextEntry3] : Gary Adler MD\par Physician in Chief\par Department of Radiation Medicine\par [facility] [place]\par [facility] [place]\par \par  of Radiation Medicine\par [facility]\par at  [facility]\par \par Radiation \par [facility]par [facility] [place]\par [address]\par [place], [place] [address]\par \par Tel: [phone]\par Fax: [phone]\par

## 2023-05-03 NOTE — LETTER CLOSING
[Consult Closing:] : Thank you for allowing me to participate in the care of this patient.  If you have any questions, please do not hesitate to contact me. [Sincerely yours,] : Sincerely yours, [FreeTextEntry3] : Gary Adler MD\par Physician in Chief\par Department of Radiation Medicine\par [facility] [place]\par [facility] [place]\par \par  of Radiation Medicine\par [facility]\par at  [facility]\par \par Radiation \par [facility]/\par [facility] [place]\par [address]\par [place], [place] [address]\par \par Tel: [phone]\par Fax: [phone]\par

## 2023-05-03 NOTE — DISEASE MANAGEMENT
[Clinical] : TNM Stage: c [0-10] : 0 -10 ng/mL [Biopsy with Fusion] : Patient had a biopsy with fusion on [] : Patient had a bone scan [1] : T1 [c] : c [0] : M0 [7(3+4)] : Fusion Biopsy [place] Score: 7(3+4) [IIB] : IIB [BiopsyDate] : 7/27/22 [MeasuredProstateVolume] : 35 [TotalCores] : 17 [TotalPositiveCores] : 8 [MaxCoreInvolvement] : 90 [CTresults] : no adenopathy [BoneScanResults] : no osseous metastasis  [FreeTextEntry4] : adenocarcinoma of the prostate [TTNM] : 1c [NTNM] : 0 [MTNM] : 0

## 2023-05-15 ENCOUNTER — APPOINTMENT (OUTPATIENT)
Dept: FAMILY MEDICINE | Facility: CLINIC | Age: 63
End: 2023-05-15
Payer: COMMERCIAL

## 2023-05-15 VITALS
HEIGHT: 72 IN | BODY MASS INDEX: 25.06 KG/M2 | RESPIRATION RATE: 16 BRPM | HEART RATE: 81 BPM | DIASTOLIC BLOOD PRESSURE: 80 MMHG | WEIGHT: 185 LBS | OXYGEN SATURATION: 98 % | SYSTOLIC BLOOD PRESSURE: 148 MMHG

## 2023-05-15 DIAGNOSIS — Z00.00 ENCOUNTER FOR GENERAL ADULT MEDICAL EXAMINATION W/OUT ABNORMAL FINDINGS: ICD-10-CM

## 2023-05-15 DIAGNOSIS — Z12.11 ENCOUNTER FOR SCREENING FOR MALIGNANT NEOPLASM OF COLON: ICD-10-CM

## 2023-05-15 PROCEDURE — 36415 COLL VENOUS BLD VENIPUNCTURE: CPT

## 2023-05-15 PROCEDURE — 99396 PREV VISIT EST AGE 40-64: CPT | Mod: 25

## 2023-05-15 RX ORDER — VARDENAFIL 20 MG/1
20 TABLET, FILM COATED ORAL
Qty: 5 | Refills: 10 | Status: DISCONTINUED | COMMUNITY
Start: 2019-07-24 | End: 2023-05-15

## 2023-05-15 RX ORDER — LISINOPRIL 20 MG/1
20 TABLET ORAL DAILY
Qty: 30 | Refills: 0 | Status: DISCONTINUED | COMMUNITY
Start: 2022-06-09 | End: 2023-05-15

## 2023-05-16 ENCOUNTER — NON-APPOINTMENT (OUTPATIENT)
Age: 63
End: 2023-05-16

## 2023-05-16 PROBLEM — Z00.00 ENCOUNTER FOR PREVENTIVE HEALTH EXAMINATION: Status: ACTIVE | Noted: 2019-07-16

## 2023-05-16 LAB
ALBUMIN SERPL ELPH-MCNC: 4.6 G/DL
ALP BLD-CCNC: 70 U/L
ALT SERPL-CCNC: 25 U/L
ANION GAP SERPL CALC-SCNC: 13 MMOL/L
AST SERPL-CCNC: 34 U/L
BASOPHILS # BLD AUTO: 0.05 K/UL
BASOPHILS NFR BLD AUTO: 0.8 %
BILIRUB SERPL-MCNC: 0.4 MG/DL
BUN SERPL-MCNC: 22 MG/DL
CALCIUM SERPL-MCNC: 9.7 MG/DL
CHLORIDE SERPL-SCNC: 105 MMOL/L
CHOLEST SERPL-MCNC: 184 MG/DL
CO2 SERPL-SCNC: 25 MMOL/L
CREAT SERPL-MCNC: 1.04 MG/DL
EGFR: 82 ML/MIN/1.73M2
EOSINOPHIL # BLD AUTO: 0.04 K/UL
EOSINOPHIL NFR BLD AUTO: 0.6 %
GLUCOSE SERPL-MCNC: 79 MG/DL
HCT VFR BLD CALC: 39.6 %
HDLC SERPL-MCNC: 64 MG/DL
HGB BLD-MCNC: 13 G/DL
IMM GRANULOCYTES NFR BLD AUTO: 0.3 %
LDLC SERPL CALC-MCNC: 97 MG/DL
LYMPHOCYTES # BLD AUTO: 1.68 K/UL
LYMPHOCYTES NFR BLD AUTO: 25.6 %
MAN DIFF?: NORMAL
MCHC RBC-ENTMCNC: 30.8 PG
MCHC RBC-ENTMCNC: 32.8 GM/DL
MCV RBC AUTO: 93.8 FL
MONOCYTES # BLD AUTO: 0.37 K/UL
MONOCYTES NFR BLD AUTO: 5.6 %
NEUTROPHILS # BLD AUTO: 4.39 K/UL
NEUTROPHILS NFR BLD AUTO: 67.1 %
NONHDLC SERPL-MCNC: 119 MG/DL
PLATELET # BLD AUTO: 237 K/UL
POTASSIUM SERPL-SCNC: 4.1 MMOL/L
PROT SERPL-MCNC: 7.1 G/DL
RBC # BLD: 4.22 M/UL
RBC # FLD: 12.1 %
SODIUM SERPL-SCNC: 143 MMOL/L
TRIGL SERPL-MCNC: 115 MG/DL
TSH SERPL-ACNC: 1.02 UIU/ML
WBC # FLD AUTO: 6.55 K/UL

## 2023-05-16 NOTE — HISTORY OF PRESENT ILLNESS
[FreeTextEntry1] : CPE [de-identified] : 61 y/o male with prostate cancer, HTN, presents for cpe. \par \par For prostate CA, patient is s/p RALP in 10/2022. He is following with urology Dr. Nath and now with oncology Dr. Adler for rising PSA. Patient will be going for repeat PSA in next 2-3, and then considering the option of salvage radiotherapy for rising psa with positive margin pathology after RALP\par \par For HTN: patient is currently on lisinopril 20mg daily. Patient states he has been checking his BP at home and getting sbp around 140s. He was previously on lisinopril-hctz but the hctz was discontinued due to his bp being well controlled.

## 2023-05-16 NOTE — ASSESSMENT
[FreeTextEntry1] : CPE:\par -will order CBC w/ diff, CMP, Lipid, TSH and HgbA1c, labs to be drawn in office\par -Colon Cancer screening: had colonoscopy in the past, due for another, will refer to GI\par -EKG: The USPSTF recommends against screening with resting electrocardiography (ECG) to prevent cardiovascular disease (CVD) events in asymptomatic adults at low risk of CVD events (10-year CVD event risk <10%)\par -Vaccinations: Td: recommended, Shingles: recommended\par -patient expressed understanding of plan, all questions answered\par \par HTN\par BP elevated\par will restart lisinopril-hctz 20-12.5mg daily\par follow up in 1 month for bp check\par \par Prostate ca\par s/p RALP\par will be going for repeat PSA in 2-3 months\par follow up with urology and oncology for further treatment

## 2023-06-05 ENCOUNTER — NON-APPOINTMENT (OUTPATIENT)
Age: 63
End: 2023-06-05

## 2023-06-15 LAB — CORE LAB BIOPSY: NORMAL

## 2023-06-19 ENCOUNTER — LABORATORY RESULT (OUTPATIENT)
Age: 63
End: 2023-06-19

## 2023-06-21 ENCOUNTER — APPOINTMENT (OUTPATIENT)
Dept: UROLOGY | Facility: CLINIC | Age: 63
End: 2023-06-21
Payer: COMMERCIAL

## 2023-06-21 PROCEDURE — 99214 OFFICE O/P EST MOD 30 MIN: CPT

## 2023-06-21 NOTE — PHYSICAL EXAM
[General Appearance - Well Developed] : well developed [General Appearance - Well Nourished] : well nourished [Normal Appearance] : normal appearance [Skin Color & Pigmentation] : normal skin color and pigmentation [Edema] : no peripheral edema [] : no respiratory distress [Oriented To Time, Place, And Person] : oriented to person, place, and time [Affect] : the affect was normal [Not Anxious] : not anxious [No Focal Deficits] : no focal deficits [Well Groomed] : well groomed [General Appearance - In No Acute Distress] : no acute distress [Abdomen Soft] : soft [Abdomen Tenderness] : non-tender [Costovertebral Angle Tenderness] : no ~M costovertebral angle tenderness [Urethral Meatus] : meatus normal [Urinary Bladder Findings] : the bladder was normal on palpation [Scrotum] : the scrotum was normal [Testes Mass (___cm)] : there were no testicular masses [Respiration, Rhythm And Depth] : normal respiratory rhythm and effort [Exaggerated Use Of Accessory Muscles For Inspiration] : no accessory muscle use [Mood] : the mood was normal [Normal Station and Gait] : the gait and station were normal for the patient's age [No Palpable Adenopathy] : no palpable adenopathy

## 2023-06-21 NOTE — HISTORY OF PRESENT ILLNESS
[FreeTextEntry1] : 62 yom s/p RALP for October 2022\par saw Dr. Adler and discussed ADT\par PSA mildly rising\par continent and potent \par \par PSA\par 0.15 6/19/23\par \par Decipher risk 0.66, high genomic risk based on his 7/22 result\par the question is whether he would need ADT 6 mos in addition to RT\par

## 2023-06-21 NOTE — ASSESSMENT
[FreeTextEntry1] : will discuss with Dr. Adler to discuss if hormonal therapy necessary\par Results of decipher test are borderline.    he has EPE focal microscopic on his RALp specimen but has no seminal vesicle or ely involvement    he does not have extensive positive margins either and has Cait 3+4=7 CAP\par \par \par we discussed the pros and cons of ADT for 6 mos.   concerned about side effects\par he will see Dr Adler on Monday.   i do believe that it is reasonable to radiate without ADT for the time being and add ADT only if necessary\par I will speak to Dr Adler to plan and come up with consensus\par reasonable to go through radiation without hormones\par pt to followup with Dr. Adler on Monday\par \par

## 2023-06-26 ENCOUNTER — APPOINTMENT (OUTPATIENT)
Dept: RADIATION ONCOLOGY | Facility: CLINIC | Age: 63
End: 2023-06-26
Payer: COMMERCIAL

## 2023-06-26 ENCOUNTER — APPOINTMENT (OUTPATIENT)
Dept: FAMILY MEDICINE | Facility: CLINIC | Age: 63
End: 2023-06-26
Payer: COMMERCIAL

## 2023-06-26 VITALS
HEART RATE: 87 BPM | SYSTOLIC BLOOD PRESSURE: 144 MMHG | TEMPERATURE: 98.2 F | OXYGEN SATURATION: 99 % | WEIGHT: 200 LBS | HEIGHT: 72 IN | DIASTOLIC BLOOD PRESSURE: 77 MMHG | RESPIRATION RATE: 16 BRPM | BODY MASS INDEX: 27.09 KG/M2

## 2023-06-26 VITALS — DIASTOLIC BLOOD PRESSURE: 68 MMHG | SYSTOLIC BLOOD PRESSURE: 122 MMHG

## 2023-06-26 VITALS
OXYGEN SATURATION: 99 % | HEART RATE: 86 BPM | SYSTOLIC BLOOD PRESSURE: 135 MMHG | RESPIRATION RATE: 16 BRPM | DIASTOLIC BLOOD PRESSURE: 77 MMHG | BODY MASS INDEX: 27.13 KG/M2 | WEIGHT: 200 LBS

## 2023-06-26 VITALS — SYSTOLIC BLOOD PRESSURE: 120 MMHG | DIASTOLIC BLOOD PRESSURE: 71 MMHG

## 2023-06-26 DIAGNOSIS — R97.20 ELEVATED PROSTATE, SPECIFIC ANTIGEN [PSA]: ICD-10-CM

## 2023-06-26 PROCEDURE — 99213 OFFICE O/P EST LOW 20 MIN: CPT

## 2023-06-26 PROCEDURE — 77263 THER RADIOLOGY TX PLNG CPLX: CPT

## 2023-06-26 NOTE — ASSESSMENT
[PSA Biochemical recurrence (non-metastatic)] : PSA Biochemical recurrence (non-metastatic) [FreeTextEntry1] : Proceed with salvage pelvic RT to prostate bed only . Hold ADT per urology and patient.

## 2023-06-26 NOTE — LETTER GREETING
[Dear Doctor] : Dear Doctor, [Follow-Up] : Your patient, [unfilled] was seen in my office today for follow-up [Please see my note below.] : Please see my note below. [FreeTextEntry2] : Oliver Nath MD

## 2023-06-26 NOTE — LETTER CLOSING
[Sincerely yours,] : Sincerely yours, [FreeTextEntry3] : Gary Adler MD\par Physician in Chief\par Department of Radiation Medicine\par [facility] [place]\par [facility] [place]\par \par  of Radiation Medicine\par [facility]\par at  [facility]\par \par Radiation \par [facility]par [facility] [place]\par [address]\par [place], [place] [address]\par \par Tel: [phone]\par Fax: [phone]\par

## 2023-06-26 NOTE — HISTORY OF PRESENT ILLNESS
[FreeTextEntry1] : 62 year old man s/p RALP for prostate cancer presents today for consultation regarding salvage radiation therapy for prostate carcinoma. Presented with a rising PSA (see below) prompting a biopsy which demomstrated [place] Score 6 and 7 in half the cores all on the right side. Underwent a RALP by Dr. Nath 10/14/22. PSA alex was 0.05 in Nov 2022 and in April 2023 is 0.11.\par \par He met with Dr Jamey Brian on 6/15/22\par 7/2019 - 2.02\par 6/2020 - 2.39\par 6/9/2021 - 4.00\par 6/9/2022 - 6.98\par 10/10/22 0.05\par \par 6/28/22 MRI pelvis:\par FINDINGS:\par \par Size: 4.5 x 3.4 x 4.3 [transverse x AP x CC] cm.\par Volume: 35 mL .\par PSA density: 0.20 ng/mL/mL\par PSA density >0.15 ng/mL/mL: Yes\par Hemorrhage: None.\par \par Central gland: Enlarged prostate gland with central prostatic hypertrophy.\par Peripheral zone: Lesion as described below.\par \par LESION: #1\par Location: Right, posterior medial (PZpm), midgland, peripheral zone.\par Slice#: Series 9, Image 15.\par Size (transverse, AP, CC): 9 x 7 x 9 mm.\par T2-WI: 4 - Circumscribed, homogenous moderate hypointense focus/mass confined to the prostate; less than 1.5 cm in greatest dimension.\par DWI: 3 - Focal (discrete and different from the background) hypointense on ADC and/or focal hyperintense on high b-value DWI; may be markedly hypointense on ADC or markedly hyperintense on high b-value DWI, but not both.\par DCE: Positive - focal, and; earlier than enhancement of adjacent normal prostatic tissues, and; corresponds to suspicious finding on T2W and/or DWI.\par Extra-prostatic extension: Abutment, tumor abuts but does not deform capsule.\par PI-RADS Assessment Category: 4, High\par \par Neurovascular bundle: No evidence of neurovascular bundle invasion.\par Seminal vesicles: No seminal vesicle invasion.\par Lymph nodes: No pelvic adenopathy.\par Bones: No suspicious lesions identified.\par Urinary bladder: Unremarkable.\par Other: None.\par \par IMPRESSION:\par Right, posterior medial (PZpm), midgland, peripheral zone lesion as detailed above.\par PIRADS 4 - High (clinically significant cancer is likely to be present)\par \par No extraprostatic extension, seminal vesicle invasion, or pelvic lymphadenopathy.\par \par Prostate Volume: 35 mL\par PSA density: 0.20 ng/mL/mL\par \par Decipher Test  = 0..66 (high risk)\par \par Patient told by Dr Nath he should probably not do ADT now , but only if progression of PSA after RT.\par

## 2023-06-26 NOTE — DISEASE MANAGEMENT
[1] : T1 [c] : c [0] : M0 [0-10] : 0 -10 ng/mL [Biopsy] : Patient had a biopsy on [7(3+4)] : Template Biopsy [place] Score: 7(3+4) [] : Patient had a Prostate MRI [4] : 4 [IIB] : IIB

## 2023-06-26 NOTE — HISTORY OF PRESENT ILLNESS
[FreeTextEntry1] : medication refill [de-identified] : 61 y/o male with prostate cancer, HTN, presents for follow up. \par \par For prostate CA, patient is s/p RALP in 10/2022. He is following with urology Dr. Nath and oncology Dr. Adler for rising PSA.\par \par For HTN: at last visit patient was restarted on hctz in addition to his lisinopril due to consistently elevated home sbp in the 140s. States his home BP readings have been improving with SBP around 130s.

## 2023-06-26 NOTE — ASSESSMENT
[FreeTextEntry1] : HTN\par bp stable\par continue lisinopril-hctz 20-12.5mg daily\par repeat bmp in 3 months\par follow up in 6 months\par \par Prostate ca\par s/p RALP\par follow up with urology and oncology for further treatment

## 2023-06-26 NOTE — PHYSICAL EXAM
[Normal] : oriented to person, place and time, the affect was normal, the mood was normal and not anxious [FreeTextEntry1] : deferred [de-identified] : deferred

## 2023-07-10 DIAGNOSIS — C61 MALIGNANT NEOPLASM OF PROSTATE: ICD-10-CM

## 2023-07-14 PROCEDURE — 77300 RADIATION THERAPY DOSE PLAN: CPT | Mod: 26

## 2023-07-14 PROCEDURE — 77338 DESIGN MLC DEVICE FOR IMRT: CPT | Mod: 26

## 2023-07-14 PROCEDURE — 77301 RADIOTHERAPY DOSE PLAN IMRT: CPT | Mod: 26

## 2023-07-24 ENCOUNTER — NON-APPOINTMENT (OUTPATIENT)
Age: 63
End: 2023-07-24

## 2023-07-24 VITALS
OXYGEN SATURATION: 96 % | RESPIRATION RATE: 16 BRPM | DIASTOLIC BLOOD PRESSURE: 79 MMHG | WEIGHT: 201 LBS | HEART RATE: 65 BPM | BODY MASS INDEX: 27.26 KG/M2 | SYSTOLIC BLOOD PRESSURE: 147 MMHG

## 2023-07-24 PROCEDURE — G6002: CPT | Mod: 26

## 2023-07-25 PROCEDURE — G6002: CPT | Mod: 26

## 2023-07-26 PROCEDURE — G6002: CPT | Mod: 26

## 2023-07-27 PROCEDURE — G6002: CPT | Mod: 26

## 2023-07-28 PROCEDURE — 77427 RADIATION TX MANAGEMENT X5: CPT

## 2023-07-28 PROCEDURE — 77014: CPT | Mod: 26

## 2023-07-31 ENCOUNTER — NON-APPOINTMENT (OUTPATIENT)
Age: 63
End: 2023-07-31

## 2023-07-31 VITALS
SYSTOLIC BLOOD PRESSURE: 135 MMHG | OXYGEN SATURATION: 98 % | DIASTOLIC BLOOD PRESSURE: 74 MMHG | BODY MASS INDEX: 27.53 KG/M2 | WEIGHT: 203 LBS | HEART RATE: 75 BPM | RESPIRATION RATE: 16 BRPM

## 2023-07-31 PROCEDURE — G6002: CPT | Mod: 26

## 2023-07-31 NOTE — DISEASE MANAGEMENT
[FreeTextEntry4] : adenocarcinoma [TTNM] : 1c [NTNM] : 0 [MTNM] : 0 [de-identified] : 250 cGy [de-identified] : 0790 cGy [de-identified] : prostate bed

## 2023-07-31 NOTE — REASON FOR VISIT
[Consideration of Curative Therapy] : consideration of curative therapy for [Prostate Cancer] : prostate cancer [Routine On-Treatment] : a routine on-treatment visit for

## 2023-08-01 PROCEDURE — G6002: CPT | Mod: 26

## 2023-08-02 PROCEDURE — G6002: CPT | Mod: 26

## 2023-08-03 PROCEDURE — G6002: CPT | Mod: 26

## 2023-08-04 PROCEDURE — 77427 RADIATION TX MANAGEMENT X5: CPT

## 2023-08-04 PROCEDURE — 77014: CPT | Mod: 26

## 2023-08-07 PROCEDURE — G6002: CPT | Mod: 26

## 2023-08-08 ENCOUNTER — NON-APPOINTMENT (OUTPATIENT)
Age: 63
End: 2023-08-08

## 2023-08-08 VITALS
BODY MASS INDEX: 27.53 KG/M2 | DIASTOLIC BLOOD PRESSURE: 82 MMHG | HEART RATE: 80 BPM | WEIGHT: 203 LBS | SYSTOLIC BLOOD PRESSURE: 136 MMHG | RESPIRATION RATE: 16 BRPM | OXYGEN SATURATION: 98 %

## 2023-08-08 PROCEDURE — G6002: CPT | Mod: 26

## 2023-08-08 NOTE — DISEASE MANAGEMENT
[Clinical] : TNM Stage: c [IIB] : IIB [FreeTextEntry4] : adenocarcinoma [TTNM] : 1c [NTNM] : 0 [MTNM] : 0 [de-identified] : 3000cGy [de-identified] : 6270 cGy [de-identified] : prostate bed

## 2023-08-09 PROCEDURE — G6002: CPT | Mod: 26

## 2023-08-10 PROCEDURE — G6002: CPT | Mod: 26

## 2023-08-11 PROCEDURE — 77427 RADIATION TX MANAGEMENT X5: CPT

## 2023-08-11 PROCEDURE — 77014: CPT | Mod: 26

## 2023-08-14 ENCOUNTER — NON-APPOINTMENT (OUTPATIENT)
Age: 63
End: 2023-08-14

## 2023-08-14 VITALS
WEIGHT: 203 LBS | HEART RATE: 76 BPM | OXYGEN SATURATION: 98 % | DIASTOLIC BLOOD PRESSURE: 72 MMHG | RESPIRATION RATE: 16 BRPM | SYSTOLIC BLOOD PRESSURE: 154 MMHG | BODY MASS INDEX: 27.53 KG/M2

## 2023-08-14 PROCEDURE — G6002: CPT | Mod: 26

## 2023-08-14 NOTE — DISEASE MANAGEMENT
[Clinical] : TNM Stage: c [IIB] : IIB [FreeTextEntry4] : adenocarcinoma [TTNM] : 1c [NTNM] : 0 [MTNM] : 0 [de-identified] : 4000cGy [de-identified] : 4430 cGy [de-identified] : prostate bed

## 2023-08-15 PROCEDURE — G6002: CPT | Mod: 26

## 2023-08-16 PROCEDURE — G6002: CPT | Mod: 26

## 2023-08-17 PROCEDURE — G6002: CPT | Mod: 26

## 2023-08-18 PROCEDURE — 77014: CPT | Mod: 26

## 2023-08-18 PROCEDURE — 77427 RADIATION TX MANAGEMENT X5: CPT

## 2023-08-21 ENCOUNTER — NON-APPOINTMENT (OUTPATIENT)
Age: 63
End: 2023-08-21

## 2023-08-21 VITALS
SYSTOLIC BLOOD PRESSURE: 144 MMHG | OXYGEN SATURATION: 99 % | WEIGHT: 201 LBS | HEART RATE: 71 BPM | DIASTOLIC BLOOD PRESSURE: 76 MMHG | RESPIRATION RATE: 16 BRPM | BODY MASS INDEX: 27.26 KG/M2

## 2023-08-21 PROCEDURE — G6002: CPT | Mod: 26

## 2023-08-21 NOTE — DISEASE MANAGEMENT
[Clinical] : TNM Stage: c [FreeTextEntry4] : adenocarcinoma [TTNM] : 1c [NTNM] : 0 [MTNM] : 0 [IIB] : IIB [de-identified] : 5250 cGy [de-identified] : 4390 cGy [de-identified] : prostate bed

## 2023-08-21 NOTE — ASSESSMENT
[FreeTextEntry1] : Discussed PSA rise and we will have him see radiation oncology or repeat PSA in 3 months\par We discussed the option of surveillance for now and repeat PSA in 3 mos\par he will  see Dr Adler in [place]
54.4

## 2023-08-22 PROCEDURE — G6002: CPT | Mod: 26

## 2023-08-23 PROCEDURE — G6002: CPT | Mod: 26

## 2023-08-24 PROCEDURE — G6002: CPT | Mod: 26

## 2023-08-25 PROCEDURE — 77014: CPT | Mod: 26

## 2023-08-25 PROCEDURE — 77427 RADIATION TX MANAGEMENT X5: CPT

## 2023-09-11 ENCOUNTER — APPOINTMENT (OUTPATIENT)
Dept: UROLOGY | Facility: CLINIC | Age: 63
End: 2023-09-11
Payer: COMMERCIAL

## 2023-09-11 PROCEDURE — 99213 OFFICE O/P EST LOW 20 MIN: CPT

## 2023-10-04 ENCOUNTER — APPOINTMENT (OUTPATIENT)
Dept: RADIATION ONCOLOGY | Facility: CLINIC | Age: 63
End: 2023-10-04
Payer: COMMERCIAL

## 2023-10-04 VITALS
DIASTOLIC BLOOD PRESSURE: 75 MMHG | BODY MASS INDEX: 27.13 KG/M2 | HEART RATE: 78 BPM | WEIGHT: 200 LBS | RESPIRATION RATE: 16 BRPM | OXYGEN SATURATION: 97 % | SYSTOLIC BLOOD PRESSURE: 137 MMHG

## 2023-10-04 PROCEDURE — 99024 POSTOP FOLLOW-UP VISIT: CPT

## 2023-10-18 ENCOUNTER — APPOINTMENT (OUTPATIENT)
Dept: RADIATION ONCOLOGY | Facility: CLINIC | Age: 63
End: 2023-10-18

## 2023-11-28 LAB
ANION GAP SERPL CALC-SCNC: 16 MMOL/L
BUN SERPL-MCNC: 20 MG/DL
CALCIUM SERPL-MCNC: 9.6 MG/DL
CHLORIDE SERPL-SCNC: 99 MMOL/L
CO2 SERPL-SCNC: 24 MMOL/L
CREAT SERPL-MCNC: 1.1 MG/DL
EGFR: 76 ML/MIN/1.73M2
GLUCOSE SERPL-MCNC: 129 MG/DL
POTASSIUM SERPL-SCNC: 4.4 MMOL/L
PSA SERPL-MCNC: 0.09 NG/ML
SODIUM SERPL-SCNC: 138 MMOL/L

## 2023-11-29 ENCOUNTER — APPOINTMENT (OUTPATIENT)
Dept: RADIATION ONCOLOGY | Facility: CLINIC | Age: 63
End: 2023-11-29
Payer: COMMERCIAL

## 2023-11-29 VITALS
RESPIRATION RATE: 18 BRPM | DIASTOLIC BLOOD PRESSURE: 78 MMHG | OXYGEN SATURATION: 97 % | HEART RATE: 80 BPM | SYSTOLIC BLOOD PRESSURE: 139 MMHG

## 2023-11-29 PROCEDURE — 99213 OFFICE O/P EST LOW 20 MIN: CPT

## 2024-01-02 ENCOUNTER — APPOINTMENT (OUTPATIENT)
Dept: FAMILY MEDICINE | Facility: CLINIC | Age: 64
End: 2024-01-02
Payer: COMMERCIAL

## 2024-01-02 VITALS
TEMPERATURE: 98.3 F | RESPIRATION RATE: 14 BRPM | HEART RATE: 67 BPM | BODY MASS INDEX: 27.77 KG/M2 | DIASTOLIC BLOOD PRESSURE: 80 MMHG | HEIGHT: 72 IN | OXYGEN SATURATION: 98 % | SYSTOLIC BLOOD PRESSURE: 118 MMHG | WEIGHT: 205 LBS

## 2024-01-02 DIAGNOSIS — I10 ESSENTIAL (PRIMARY) HYPERTENSION: ICD-10-CM

## 2024-01-02 PROCEDURE — 99213 OFFICE O/P EST LOW 20 MIN: CPT

## 2024-01-02 RX ORDER — LISINOPRIL AND HYDROCHLOROTHIAZIDE TABLETS 20; 12.5 MG/1; MG/1
20-12.5 TABLET ORAL
Qty: 90 | Refills: 2 | Status: ACTIVE | COMMUNITY
Start: 2019-07-24 | End: 1900-01-01

## 2024-01-02 NOTE — HISTORY OF PRESENT ILLNESS
[FreeTextEntry1] : Pt is here for 6 month f/u on BP and medication renewal. [de-identified] : 62 y/o male with prostate cancer, HTN, presents for follow up.   For prostate CA, patient is s/p RALP in 10/2022. He is following with urology Dr. Nath and oncology Dr. Adler for rising PSA.  For HTN: Patient in need of a refill of his bp medication. BP has been stable. Most recent bmp 11/2023 was stable

## 2024-01-02 NOTE — ASSESSMENT
[FreeTextEntry1] : HTN bp stable continue lisinopril-hctz 20-12.5mg daily follow up in 6 months for cpe  Prostate ca s/p RALP follow up with urology and oncology for further treatment.

## 2024-01-08 ENCOUNTER — APPOINTMENT (OUTPATIENT)
Dept: UROLOGY | Facility: CLINIC | Age: 64
End: 2024-01-08
Payer: COMMERCIAL

## 2024-01-08 DIAGNOSIS — C61 MALIGNANT NEOPLASM OF PROSTATE: ICD-10-CM

## 2024-01-08 PROCEDURE — 99213 OFFICE O/P EST LOW 20 MIN: CPT

## 2024-01-08 RX ORDER — TADALAFIL 20 MG/1
20 TABLET ORAL
Qty: 30 | Refills: 4 | Status: ACTIVE | COMMUNITY
Start: 2024-01-08 | End: 1900-01-01

## 2024-01-08 NOTE — HISTORY OF PRESENT ILLNESS
[FreeTextEntry1] : 63M here for prostate cancer s/p RALP 10/2022 4+3 disease LN (-) completed radiation 8/2023 PSA 0.09 11/2023  urinating well good erections on cialis

## 2024-01-08 NOTE — ASSESSMENT
[FreeTextEntry1] : 63M s/p RALP 10/2022. had salvage radiation  --PSA downtrending --repeat PSA 2months as planned with radiation oncology --continue Cialis --RTC 2 months

## 2024-03-06 ENCOUNTER — LABORATORY RESULT (OUTPATIENT)
Age: 64
End: 2024-03-06

## 2024-05-31 ENCOUNTER — APPOINTMENT (OUTPATIENT)
Dept: INTERNAL MEDICINE | Facility: CLINIC | Age: 64
End: 2024-05-31

## 2024-05-31 ENCOUNTER — APPOINTMENT (OUTPATIENT)
Dept: FAMILY MEDICINE | Facility: CLINIC | Age: 64
End: 2024-05-31

## 2024-06-10 ENCOUNTER — APPOINTMENT (OUTPATIENT)
Dept: UROLOGY | Facility: CLINIC | Age: 64
End: 2024-06-10

## 2024-06-26 ENCOUNTER — APPOINTMENT (OUTPATIENT)
Dept: RADIATION ONCOLOGY | Facility: CLINIC | Age: 64
End: 2024-06-26
Payer: COMMERCIAL

## 2024-06-26 VITALS
BODY MASS INDEX: 27.9 KG/M2 | HEART RATE: 88 BPM | RESPIRATION RATE: 16 BRPM | WEIGHT: 206 LBS | SYSTOLIC BLOOD PRESSURE: 135 MMHG | HEIGHT: 72 IN | DIASTOLIC BLOOD PRESSURE: 77 MMHG | OXYGEN SATURATION: 99 %

## 2024-06-26 DIAGNOSIS — Z85.46 PERSONAL HISTORY OF MALIGNANT NEOPLASM OF PROSTATE: ICD-10-CM

## 2024-06-26 DIAGNOSIS — Z92.3 PERSONAL HISTORY OF IRRADIATION: ICD-10-CM

## 2024-06-26 PROCEDURE — 99212 OFFICE O/P EST SF 10 MIN: CPT

## 2024-06-26 PROCEDURE — G2211 COMPLEX E/M VISIT ADD ON: CPT

## 2024-06-28 PROBLEM — Z92.3 PERSONAL HISTORY OF RADIATION THERAPY: Status: ACTIVE | Noted: 2023-12-01

## 2024-06-28 PROBLEM — Z85.46 HISTORY OF PROSTATE CANCER: Status: ACTIVE | Noted: 2024-06-28

## 2024-09-16 ENCOUNTER — TRANSCRIPTION ENCOUNTER (OUTPATIENT)
Age: 64
End: 2024-09-16

## 2024-12-06 ENCOUNTER — LABORATORY RESULT (OUTPATIENT)
Age: 64
End: 2024-12-06

## 2024-12-18 ENCOUNTER — APPOINTMENT (OUTPATIENT)
Dept: RADIATION ONCOLOGY | Facility: CLINIC | Age: 64
End: 2024-12-18
Payer: COMMERCIAL

## 2024-12-18 VITALS
DIASTOLIC BLOOD PRESSURE: 82 MMHG | HEART RATE: 68 BPM | TEMPERATURE: 98 F | OXYGEN SATURATION: 96 % | SYSTOLIC BLOOD PRESSURE: 151 MMHG | WEIGHT: 210.6 LBS | RESPIRATION RATE: 16 BRPM | BODY MASS INDEX: 28.56 KG/M2

## 2024-12-18 DIAGNOSIS — Z92.3 PERSONAL HISTORY OF IRRADIATION: ICD-10-CM

## 2024-12-18 DIAGNOSIS — Z90.79 ACQUIRED ABSENCE OF OTHER GENITAL ORGAN(S): ICD-10-CM

## 2024-12-18 DIAGNOSIS — Z85.46 PERSONAL HISTORY OF MALIGNANT NEOPLASM OF PROSTATE: ICD-10-CM

## 2024-12-18 PROCEDURE — 99213 OFFICE O/P EST LOW 20 MIN: CPT

## 2024-12-20 PROBLEM — Z90.79 STATUS POST PROSTATECTOMY: Status: ACTIVE | Noted: 2024-12-20

## 2025-01-16 ENCOUNTER — LABORATORY RESULT (OUTPATIENT)
Age: 65
End: 2025-01-16

## 2025-01-22 ENCOUNTER — APPOINTMENT (OUTPATIENT)
Dept: RADIATION ONCOLOGY | Facility: CLINIC | Age: 65
End: 2025-01-22
Payer: COMMERCIAL

## 2025-01-22 VITALS
SYSTOLIC BLOOD PRESSURE: 149 MMHG | OXYGEN SATURATION: 96 % | HEART RATE: 84 BPM | WEIGHT: 210 LBS | RESPIRATION RATE: 16 BRPM | DIASTOLIC BLOOD PRESSURE: 79 MMHG | HEIGHT: 72 IN | BODY MASS INDEX: 28.44 KG/M2

## 2025-01-22 DIAGNOSIS — Z85.46 PERSONAL HISTORY OF MALIGNANT NEOPLASM OF PROSTATE: ICD-10-CM

## 2025-01-22 DIAGNOSIS — R97.20 ELEVATED PROSTATE, SPECIFIC ANTIGEN [PSA]: ICD-10-CM

## 2025-01-22 DIAGNOSIS — Z90.79 ACQUIRED ABSENCE OF OTHER GENITAL ORGAN(S): ICD-10-CM

## 2025-01-22 DIAGNOSIS — Z92.3 PERSONAL HISTORY OF IRRADIATION: ICD-10-CM

## 2025-01-22 PROCEDURE — G2211 COMPLEX E/M VISIT ADD ON: CPT | Mod: NC

## 2025-01-22 PROCEDURE — 99213 OFFICE O/P EST LOW 20 MIN: CPT

## 2025-01-27 NOTE — H&P PST ADULT - RESPIRATORY AND THORAX
[facility] [address], [address]  [facility] [address]  Phone: [phone]  Fax: [phone]    Patient:  Susannah Goddard 1972  Date of Visit:  10:36 AM  Referring Provider Referred Self      Assessment & Plan    (Z09) Hospital discharge follow-up  (primary encounter diagnosis)  (R07.9) Chest pain, unspecified type  Comment: recent ER visit or evaluation of chest pain, comprehensive cardiac workup, no concerning findings.  Plan: reassurance. Discussed signs/ symptoms of heart disease, notify me for any acute changes    (F41.9,  F32.A) Anxiety and depression  Comment: symptoms flaring, she has mental health prescriber and therapist, looking also for trauma based therapist.   Plan: keep your mental health provider apprised of your symptoms. Agree with looking for trauma based therapist. She has remained sober despite the changes, congratulated her on that. Continue AA meetings. She agrees to seek crisis intervention, ER if needed. Depression action plan shared with Susannah.    (R63.5) Weight gain  Comment: weight gain of 12 lbs, but down 2 pounds since last visit, she has made plan to incorporate healthier eating, adding exercise as she has a gym in her building  Plan: support Susannah in her plan    (A60.00) Genital herpes simplex, unspecified site  Comment: daily dose to prevent outbreak, working well  Plan: valACYclovir (VALTREX) 500 MG tablet        Refilled for one year.    (I10) Benign essential hypertension  Comment: BP in target range  BP Readings from Last 3 Encounters:   02/04/25 128/89   07/01/24 108/68   03/03/23 119/76   Plan: amLODIPine (NORVASC) 10 MG tablet        Refilled for one year    (Z23) Need for Tdap vaccination  Comment: she is due for tetanus booster  Plan: go to pharmacy for this vaccine, due to medicare insurance.     Madyson Nunez MD       Susannah Goddard is a 51 yo woman with hx of depression with anxiety, genital herpes, alcohol  "dependence, sober x 13 yr, and Raynaud's disease . 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     Last feb 9, 2011, sober ty 13 yr--SOBRIETY  [employer] is helpful  No plan for suicide, I\"I want to drive to [place] to live\" she does not wish to end her life  Procrastinating and realizes this is not useful      3/3/2023    12:38 PM 7/1/2024     1:28 PM 2/4/2025     2:08 PM   PHQ   PHQ-9 Total Score 6 10 10    Q9: Thoughts of better off dead/self-harm past 2 weeks Not at all Not at all More than half the days   F/U: Thoughts of suicide or self-harm   Yes   F/U: Self harm-plan   No   F/U: Self-harm action   No   F/U: Safety concerns   No       Patient-reported         8/1/2022     1:28 PM 3/3/2023    12:38 PM 2/4/2025     2:08 PM   FRANK-7 SCORE   Total Score   21 (severe anxiety)   Total Score 21 6 21        Patient-reported     Weight gain  Last seen in clinic 7/2024 (preventive exam), concerned about 12# gain/year  Sugar craving and craving of protein bars  No meal planning at that time  Discussed strategies for change  Did not meet criteria for use of GLP-1, normal A1c  Today  Eating more veggies  Starting lifting weights today  Previous herbalife 2 shakes a day and a meal in the " evening  Plan for March   Has a gym in her apartment  Indicates she is ready for change and wishes to implement that change    Wt Readings from Last 4 Encounters:   02/04/25 85.3 kg (188 lb)   07/01/24 86.2 kg (190 lb)   05/12/23 81 kg (178 lb 9.6 oz)   03/03/23 80.7 kg (178 lb)     Body mass index is 31.32 kg/m .      HTN  Amlodipine 10mg daily  Clonidine 0.2mg po TID (treating PTSD)  Cannabis last night, lost job in August 2024 now part time job, [employer], waiver services for people on disability  No BP machine at home  BP Readings from Last 3 Encounters:   02/04/25 128/89   07/01/24 108/68   03/03/23 119/76     Decided against breast reduction  Neck and upper back pain  Tumeric powder 2 T in protein shake  Decided against breast reduction, did not want scars      Patient Active Problem List   Diagnosis    Anxiety and depression    Herpes, genital    Alcohol dependence in remission (H)    Raynaud's disease without gangrene    Neck and shoulder pain    Benign essential hypertension    Hypertrophy of breast       Current Outpatient Medications   Medication Sig Dispense Refill    amLODIPine (NORVASC) 10 MG tablet Take 1 tablet (10 mg) by mouth daily 90 tablet 1    cloNIDine (CATAPRES) 0.2 MG tablet Take 0.2 mg by mouth 3 times daily      DULoxetine (CYMBALTA) 60 MG capsule Take 2 capsules (120 mg) by mouth daily Managed by psychiatrist      escitalopram (LEXAPRO) 20 MG tablet Take 1 tablet (20 mg) by mouth daily Managed by psychiatrist      Lysine 500 MG CAPS Take 1 capsule by mouth daily. 90 capsule 3    omega 3 1000 MG CAPS Take 1000mg daily 90 capsule 3    QUEtiapine (SEROQUEL) 50 MG tablet 1 tablet (50 mg) 2 times daily Take 50-100mg po BID and take 100mg po q hs, prescribed by psychiatry      valACYclovir (VALTREX) 500 MG tablet Take 1 po q day to prevent HSV outbreak 90 tablet 1       Allergies   Allergen Reactions    Dilaudid [Hydromorphone]         EXAM  /89 (BP Location: Left arm, Patient  "Position: Sitting, Cuff Size: Adult Regular)   Pulse 67   Temp (!) 96.7  F (35.9  C) (Skin)   Resp 16   Ht 1.65 m (5' 4.96\")   Wt 85.3 kg (188 lb)   LMP  (LMP Unknown)   SpO2 98%   BMI 31.32 kg/m      Gen: Alert, pleasant, NAD  COR: S1,S2, no murmur  Lungs: CTA bilaterally, no rhonchi, wheezes or rales  Ext: no peripheral edema, pulses full  Answers submitted by the patient for this visit:  Patient Health Questionnaire (Submitted on 2/4/2025)  If you checked off any problems, how difficult have these problems made it for you to do your work, take care of things at home, or get along with other people?: Very difficult  PHQ9 TOTAL SCORE: 10  Patient Health Questionnaire (G7) (Submitted on 2/4/2025)  FRANK 7 TOTAL SCORE: 21    " details…

## 2025-02-17 ENCOUNTER — OUTPATIENT (OUTPATIENT)
Dept: OUTPATIENT SERVICES | Facility: HOSPITAL | Age: 65
LOS: 1 days | End: 2025-02-17

## 2025-02-17 ENCOUNTER — APPOINTMENT (OUTPATIENT)
Dept: NUCLEAR MEDICINE | Facility: CLINIC | Age: 65
End: 2025-02-17
Payer: COMMERCIAL

## 2025-02-17 ENCOUNTER — RESULT REVIEW (OUTPATIENT)
Age: 65
End: 2025-02-17

## 2025-02-17 DIAGNOSIS — H26.9 UNSPECIFIED CATARACT: Chronic | ICD-10-CM

## 2025-02-17 DIAGNOSIS — Z98.890 OTHER SPECIFIED POSTPROCEDURAL STATES: Chronic | ICD-10-CM

## 2025-02-17 DIAGNOSIS — C61 MALIGNANT NEOPLASM OF PROSTATE: ICD-10-CM

## 2025-02-17 PROCEDURE — 78816 PET IMAGE W/CT FULL BODY: CPT | Mod: 26

## 2025-02-19 ENCOUNTER — TRANSCRIPTION ENCOUNTER (OUTPATIENT)
Age: 65
End: 2025-02-19

## 2025-02-28 ENCOUNTER — EMERGENCY (EMERGENCY)
Facility: HOSPITAL | Age: 65
LOS: 1 days | Discharge: HOME | End: 2025-02-28
Attending: STUDENT IN AN ORGANIZED HEALTH CARE EDUCATION/TRAINING PROGRAM
Payer: COMMERCIAL

## 2025-02-28 VITALS
TEMPERATURE: 97 F | SYSTOLIC BLOOD PRESSURE: 159 MMHG | RESPIRATION RATE: 18 BRPM | HEART RATE: 85 BPM | DIASTOLIC BLOOD PRESSURE: 92 MMHG | OXYGEN SATURATION: 97 % | WEIGHT: 199.96 LBS

## 2025-02-28 DIAGNOSIS — Z98.890 OTHER SPECIFIED POSTPROCEDURAL STATES: Chronic | ICD-10-CM

## 2025-02-28 DIAGNOSIS — H26.9 UNSPECIFIED CATARACT: Chronic | ICD-10-CM

## 2025-02-28 PROCEDURE — 99283 EMERGENCY DEPT VISIT LOW MDM: CPT | Mod: 25

## 2025-02-28 PROCEDURE — 73140 X-RAY EXAM OF FINGER(S): CPT

## 2025-02-28 PROCEDURE — 73140 X-RAY EXAM OF FINGER(S): CPT | Mod: 26,LT

## 2025-02-28 PROCEDURE — 26770 TREAT FINGER DISLOCATION: CPT | Mod: F4

## 2025-02-28 PROCEDURE — 26770 TREAT FINGER DISLOCATION: CPT | Mod: 54,F4

## 2025-02-28 PROCEDURE — 99284 EMERGENCY DEPT VISIT MOD MDM: CPT | Mod: 57

## 2025-02-28 NOTE — ED PROVIDER NOTE - CLINICAL SUMMARY MEDICAL DECISION MAKING FREE TEXT BOX
Patient on exam with dislocation of the left fifth digit PIP reduced while being examined no acute fracture on x-ray splinted stable for DC with follow-up given return precautions

## 2025-02-28 NOTE — ED PROVIDER NOTE - NSFOLLOWUPINSTRUCTIONS_ED_ALL_ED_FT
Finger or Thumb Dislocation    Finger or thumb dislocation happens when the bones in a joint move out of their normal positions. Dislocations may occur in any joint in the fingers or thumb. Finger or thumb dislocation is a common and serious injury.    What are the causes?  This condition is caused by a forceful impact or injury to the hand or when the finger or thumb bends the wrong way (hyperextension).    What increases the risk?  You are more likely to develop this condition if you:    Have previously injured your hand.  Do repetitive motions with your hands, such as when playing sports or doing heavy labor.  Have poor hand strength and flexibility.    What are the signs or symptoms?  Symptoms of this condition may include:    Deformity of the injured area. The affected joint may look like it is out of place or at an odd angle.  Pain, swelling, and bruising in the injured area.  Limited range of motion of the finger or thumb.    How is this diagnosed?  This condition is diagnosed with a physical exam. You may also have X-rays to check for breaks (fractures) in your bones.    How is this treated?  For mild dislocations, this condition is treated by moving your finger or thumb back into position (reduction). Your health care provider may do this by hand (manually) or with surgery. You may need surgical reduction if you have:    A severe dislocation.  A dislocation that cannot be reduced manually.  A fractured bone.  An open wound.    After reduction, your finger or thumb may be kept in a fixed position (immobilized) with a splint for up to 6 weeks. You may also need physical therapy. In some cases, you may need to go to a health care provider who specializes in bone disorders (orthopedist) to help treat your condition.    Follow these instructions at home:      If you have a splint:    Do not put pressure on any part of the splint until it is fully hardened. This may take several hours.  Wear the splint as told by your health care provider. Remove it only as told by your health care provider.  Loosen the splint if your fingers tingle, become numb, or turn cold and blue.  Keep the splint clean.  If the splint is not waterproof:    Do not let it get wet.  Cover it with a watertight covering when you take a bath or shower.        Managing pain, stiffness, and swelling     If directed, put ice on the injured area.    If you have a removable splint, remove it as told by your health care provider.  Put ice in a plastic bag.  Place a towel between your skin and the bag.  Leave the ice on for 20 minutes, 2–3 times a day.  Move your fingers often to reduce stiffness and swelling.  Raise (elevate) the injured area above the level of your heart while you are sitting or lying down.        Activity    Return to your normal activities as told by your health care provider. Ask your health care provider what activities are safe for you.  Rest and limit your hand movement as told by your health care provider.  If physical therapy was prescribed, do exercises as told by your health care provider.        Driving    Ask your health care provider if the medicine prescribed to you requires you to avoid driving or using heavy machinery.  Ask your health care provider when it is safe to drive if you have a splint on your hand.        General instructions    Take over-the-counter and prescription medicines only as told by your health care provider.  Do not take baths, swim, or use a hot tub until your health care provider approves. Ask your health care provider if you may take showers. You may only be allowed to take sponge baths.  Do not use any products that contain nicotine or tobacco, such as cigarettes, e-cigarettes, and chewing tobacco. These can delay bone healing. If you need help quitting, ask your health care provider.  Keep all follow-up visits as told by your health care provider. This is important.    Contact a health care provider if you have:  Problems with your splint.  Pain that gets worse or does not get better with medicine.  More bruising, swelling, or redness in your injured area.  Difficulty moving your finger or thumb after it heals.    Get help right away if:  You develop numbness in your finger or thumb.  You cannot move your finger or thumb.  Your finger or thumb is pale or cold.  You have severe pain.    Summary  Finger or thumb dislocation happens when the bones in a joint move out of their normal positions.  This condition is caused by a forceful impact or injury to the hand.  For mild dislocations, this condition is treated by moving your finger or thumb back into position (reduction).  You may need surgery if you have a severe dislocation, an open wound, or a fractured bone.    ADDITIONAL NOTES AND INSTRUCTIONS    Please follow up with your Primary MD in 24-48 hr.  Seek immediate medical care for any new/worsening signs or symptoms.

## 2025-02-28 NOTE — ED PROVIDER NOTE - PATIENT PORTAL LINK FT
You can access the FollowMyHealth Patient Portal offered by [facility] by registering at the following website: [email or web address]. By joining [employer]’s FollowMyHealth portal, you will also be able to view your health information using other applications (apps) compatible with our system.

## 2025-02-28 NOTE — ED PROVIDER NOTE - OBJECTIVE STATEMENT
64-year-old male with PMH of hypertension not on antiplatelets or anticoagulants presents status post trip with left fifth digit pain.  Patient states he was at [employer] tripped on something on the ground and as he braced his fall he held onto something with his left hand did not fall to the ground did not have any head trauma no other injuries.  Patient reporting left fifth digit pain and swelling.  Patient denies any preceding symptoms to trip. Pt denies fevers/chills, ha, loc, focal neuro deficits, cp/sob/palp, cough, abd pain/n/v/d, urinary symptoms, recent travel and sick contacts.

## 2025-03-07 ENCOUNTER — APPOINTMENT (OUTPATIENT)
Dept: INTERNAL MEDICINE | Facility: CLINIC | Age: 65
End: 2025-03-07
Payer: COMMERCIAL

## 2025-03-07 VITALS
DIASTOLIC BLOOD PRESSURE: 80 MMHG | HEART RATE: 82 BPM | SYSTOLIC BLOOD PRESSURE: 130 MMHG | HEIGHT: 72 IN | OXYGEN SATURATION: 98 % | WEIGHT: 210 LBS | BODY MASS INDEX: 28.44 KG/M2

## 2025-03-07 DIAGNOSIS — C61 MALIGNANT NEOPLASM OF PROSTATE: ICD-10-CM

## 2025-03-07 DIAGNOSIS — R79.9 ABNORMAL FINDING OF BLOOD CHEMISTRY, UNSPECIFIED: ICD-10-CM

## 2025-03-07 DIAGNOSIS — Z00.00 ENCOUNTER FOR GENERAL ADULT MEDICAL EXAMINATION W/OUT ABNORMAL FINDINGS: ICD-10-CM

## 2025-03-07 DIAGNOSIS — I10 ESSENTIAL (PRIMARY) HYPERTENSION: ICD-10-CM

## 2025-03-07 DIAGNOSIS — S63.259A UNSPECIFIED DISLOCATION OF UNSPECIFIED FINGER, INITIAL ENCOUNTER: ICD-10-CM

## 2025-03-07 DIAGNOSIS — Z12.11 ENCOUNTER FOR SCREENING FOR MALIGNANT NEOPLASM OF COLON: ICD-10-CM

## 2025-03-07 PROCEDURE — 99396 PREV VISIT EST AGE 40-64: CPT

## 2025-03-07 PROCEDURE — 36415 COLL VENOUS BLD VENIPUNCTURE: CPT

## 2025-03-12 LAB
ALBUMIN SERPL ELPH-MCNC: 4.6 G/DL
ALP BLD-CCNC: 79 U/L
ALT SERPL-CCNC: 30 U/L
ANION GAP SERPL CALC-SCNC: 14 MMOL/L
AST SERPL-CCNC: 35 U/L
BASOPHILS # BLD AUTO: 0.05 K/UL
BASOPHILS NFR BLD AUTO: 0.7 %
BILIRUB SERPL-MCNC: 0.2 MG/DL
BUN SERPL-MCNC: 17 MG/DL
CALCIUM SERPL-MCNC: 9.8 MG/DL
CHLORIDE SERPL-SCNC: 103 MMOL/L
CHOLEST SERPL-MCNC: 211 MG/DL
CO2 SERPL-SCNC: 24 MMOL/L
CREAT SERPL-MCNC: 1 MG/DL
EGFRCR SERPLBLD CKD-EPI 2021: 85 ML/MIN/1.73M2
EOSINOPHIL # BLD AUTO: 0.1 K/UL
EOSINOPHIL NFR BLD AUTO: 1.3 %
ESTIMATED AVERAGE GLUCOSE: 120 MG/DL
GLUCOSE SERPL-MCNC: 83 MG/DL
HBA1C MFR BLD HPLC: 5.8 %
HCT VFR BLD CALC: 40.2 %
HDLC SERPL-MCNC: 58 MG/DL
HGB BLD-MCNC: 13.5 G/DL
IMM GRANULOCYTES NFR BLD AUTO: 0.1 %
LDLC SERPL CALC-MCNC: 110 MG/DL
LYMPHOCYTES # BLD AUTO: 1.73 K/UL
LYMPHOCYTES NFR BLD AUTO: 23.1 %
MAN DIFF?: NORMAL
MCHC RBC-ENTMCNC: 30.4 PG
MCHC RBC-ENTMCNC: 33.6 G/DL
MCV RBC AUTO: 90.5 FL
MONOCYTES # BLD AUTO: 0.6 K/UL
MONOCYTES NFR BLD AUTO: 8 %
NEUTROPHILS # BLD AUTO: 4.99 K/UL
NEUTROPHILS NFR BLD AUTO: 66.8 %
NONHDLC SERPL-MCNC: 153 MG/DL
PLATELET # BLD AUTO: 285 K/UL
POTASSIUM SERPL-SCNC: 4.2 MMOL/L
PROT SERPL-MCNC: 7.3 G/DL
RBC # BLD: 4.44 M/UL
RBC # FLD: 12.3 %
SODIUM SERPL-SCNC: 142 MMOL/L
TRIGL SERPL-MCNC: 256 MG/DL
TSH SERPL-ACNC: 1.56 UIU/ML
WBC # FLD AUTO: 7.48 K/UL

## 2025-03-28 ENCOUNTER — RX RENEWAL (OUTPATIENT)
Age: 65
End: 2025-03-28

## 2025-04-02 ENCOUNTER — TRANSCRIPTION ENCOUNTER (OUTPATIENT)
Age: 65
End: 2025-04-02

## 2025-04-28 ENCOUNTER — LABORATORY RESULT (OUTPATIENT)
Age: 65
End: 2025-04-28

## 2025-04-30 ENCOUNTER — APPOINTMENT (OUTPATIENT)
Dept: RADIATION ONCOLOGY | Facility: CLINIC | Age: 65
End: 2025-04-30
Payer: COMMERCIAL

## 2025-04-30 VITALS
WEIGHT: 209 LBS | DIASTOLIC BLOOD PRESSURE: 73 MMHG | RESPIRATION RATE: 16 BRPM | SYSTOLIC BLOOD PRESSURE: 115 MMHG | OXYGEN SATURATION: 95 % | HEART RATE: 81 BPM | BODY MASS INDEX: 28.35 KG/M2

## 2025-04-30 DIAGNOSIS — R97.20 ELEVATED PROSTATE, SPECIFIC ANTIGEN [PSA]: ICD-10-CM

## 2025-04-30 DIAGNOSIS — Z92.3 PERSONAL HISTORY OF IRRADIATION: ICD-10-CM

## 2025-04-30 DIAGNOSIS — Z85.46 PERSONAL HISTORY OF MALIGNANT NEOPLASM OF PROSTATE: ICD-10-CM

## 2025-04-30 PROCEDURE — 99213 OFFICE O/P EST LOW 20 MIN: CPT

## 2025-08-20 ENCOUNTER — LABORATORY RESULT (OUTPATIENT)
Age: 65
End: 2025-08-20

## 2025-08-21 ENCOUNTER — APPOINTMENT (OUTPATIENT)
Dept: RADIATION ONCOLOGY | Facility: CLINIC | Age: 65
End: 2025-08-21
Payer: COMMERCIAL

## 2025-08-21 VITALS
HEIGHT: 72 IN | BODY MASS INDEX: 28.44 KG/M2 | DIASTOLIC BLOOD PRESSURE: 80 MMHG | WEIGHT: 210 LBS | RESPIRATION RATE: 16 BRPM | HEART RATE: 88 BPM | SYSTOLIC BLOOD PRESSURE: 146 MMHG | OXYGEN SATURATION: 97 %

## 2025-08-21 DIAGNOSIS — Z85.46 PERSONAL HISTORY OF MALIGNANT NEOPLASM OF PROSTATE: ICD-10-CM

## 2025-08-21 DIAGNOSIS — Z92.3 PERSONAL HISTORY OF IRRADIATION: ICD-10-CM

## 2025-08-21 DIAGNOSIS — Z90.79 ACQUIRED ABSENCE OF OTHER GENITAL ORGAN(S): ICD-10-CM

## 2025-08-21 DIAGNOSIS — R97.20 ELEVATED PROSTATE, SPECIFIC ANTIGEN [PSA]: ICD-10-CM

## 2025-08-21 PROCEDURE — 99212 OFFICE O/P EST SF 10 MIN: CPT

## 2025-09-12 ENCOUNTER — APPOINTMENT (OUTPATIENT)
Dept: INTERNAL MEDICINE | Facility: CLINIC | Age: 65
End: 2025-09-12